# Patient Record
Sex: FEMALE | Race: WHITE | NOT HISPANIC OR LATINO | Employment: OTHER | URBAN - METROPOLITAN AREA
[De-identification: names, ages, dates, MRNs, and addresses within clinical notes are randomized per-mention and may not be internally consistent; named-entity substitution may affect disease eponyms.]

---

## 2017-07-06 ENCOUNTER — OFFICE VISIT (OUTPATIENT)
Dept: NEUROSURGERY | Facility: CLINIC | Age: 77
End: 2017-07-06

## 2017-07-06 VITALS — TEMPERATURE: 96.9 F | WEIGHT: 130 LBS | BODY MASS INDEX: 20.89 KG/M2 | HEIGHT: 66 IN

## 2017-07-06 DIAGNOSIS — M48.061 LUMBAR STENOSIS: Primary | ICD-10-CM

## 2017-07-06 PROCEDURE — 99203 OFFICE O/P NEW LOW 30 MIN: CPT | Performed by: NEUROLOGICAL SURGERY

## 2017-07-06 RX ORDER — UBIDECARENONE 75 MG
50 CAPSULE ORAL DAILY
COMMUNITY

## 2017-07-06 RX ORDER — AZITHROMYCIN 250 MG/1
250 TABLET, FILM COATED ORAL DAILY
COMMUNITY
End: 2017-12-01

## 2017-07-06 RX ORDER — BENZONATATE 200 MG/1
200 CAPSULE ORAL 3 TIMES DAILY PRN
COMMUNITY
End: 2017-12-01

## 2017-07-06 RX ORDER — MAGNESIUM CARB/ALUMINUM HYDROX 105-160MG
TABLET,CHEWABLE ORAL ONCE
COMMUNITY
End: 2017-12-01

## 2017-07-06 RX ORDER — ERGOCALCIFEROL (VITAMIN D2) 10 MCG
400 TABLET ORAL DAILY
COMMUNITY

## 2017-07-06 RX ORDER — NITROGLYCERIN 0.4 MG/1
0.4 TABLET SUBLINGUAL
COMMUNITY

## 2017-07-06 RX ORDER — ASPIRIN 81 MG/1
81 TABLET ORAL DAILY
COMMUNITY

## 2017-07-06 RX ORDER — ROSUVASTATIN CALCIUM 10 MG/1
10 TABLET, COATED ORAL DAILY
COMMUNITY

## 2017-07-06 RX ORDER — TEMAZEPAM 30 MG/1
30 CAPSULE ORAL NIGHTLY PRN
COMMUNITY

## 2017-07-06 RX ORDER — CHLORAL HYDRATE 500 MG
1000 CAPSULE ORAL
COMMUNITY

## 2017-07-06 NOTE — PROGRESS NOTES
Subjective   Patient ID: Ramya Contreras is a 76 y.o. female is being seen for consultation today at the request of Leigh Agustin MD  Chief Complaint: Right hip and leg pain    History of Present Illness: The patient is a 76-year-old woman with a 5 year history of pain radiating from her right hip to her right leg, particularly with standing and walking.  It began after she had a revision of a right hip joint replacement in 2012.  She has had a left hip replacement as well.  She complains of pain occurs particularly with standing and walking, although with sitting she has to tilt toward the left and tends to put her hand on her right hip.  She recently visited [facility] and after the trip decided pain had become sufficiently severe that she wanted to seek medical relief.  MRI of her lumbar spine was done.  She complains of her right foot turning in when she walks.  He exercises regularly.    Review of Radiographic Studies:   Lumbar MRI scan shows a moderately severe stenosis bilaterally at L4 5 with a mild scoliosis.    The following portions of the patient's history were reviewed, updated as appropriate and approved: allergies, current medications, past family history, past medical history, past social history, past surgical history, review of systems and problem list.    Review of Systems   Constitutional: Negative for activity change, appetite change, chills, diaphoresis, fatigue, fever and unexpected weight change.   HENT: Negative for congestion, dental problem, drooling, ear discharge, ear pain, facial swelling, hearing loss, mouth sores, nosebleeds, postnasal drip, rhinorrhea, sinus pressure, sneezing, sore throat, tinnitus, trouble swallowing and voice change.    Eyes: Negative for photophobia, pain, discharge, redness, itching and visual disturbance.   Respiratory: Negative for apnea, cough, choking, chest tightness, shortness of breath, wheezing and stridor.    Cardiovascular: Negative for chest pain,  palpitations and leg swelling.   Gastrointestinal: Negative for abdominal distention, abdominal pain, anal bleeding, blood in stool, constipation, diarrhea, nausea, rectal pain and vomiting.   Endocrine: Negative for cold intolerance, heat intolerance, polydipsia, polyphagia and polyuria.   Genitourinary: Negative for decreased urine volume, difficulty urinating, dysuria, enuresis, flank pain, frequency, genital sores, hematuria and urgency.   Musculoskeletal: Positive for back pain and myalgias. Negative for arthralgias, gait problem, joint swelling, neck pain and neck stiffness.   Skin: Negative for color change, pallor, rash and wound.   Allergic/Immunologic: Negative for environmental allergies, food allergies and immunocompromised state.   Neurological: Negative for dizziness, tremors, seizures, syncope, facial asymmetry, speech difficulty, weakness, light-headedness, numbness and headaches.   Hematological: Negative for adenopathy. Does not bruise/bleed easily.   Psychiatric/Behavioral: Negative for agitation, behavioral problems, confusion, decreased concentration, dysphoric mood, hallucinations, self-injury, sleep disturbance and suicidal ideas. The patient is not nervous/anxious and is not hyperactive.        Objective     NEUROLOGICAL EXAMINATION:      MENTAL STATUS:  Alert and oriented.  Speech intact.  Recent and remote memory intact.      CRANIAL NERVES:  Cranial nerve II:  Visual fields are full to confrontation.  Cranial nerves III, IV and VI:  PERRLADC.  Extraocular movements are intact.  Nystagmus is not present.  Cranial nerve V:  Facial sensation is intact to light touch.  Cranial nerve VII:  Muscles of facial expression reveal no asymmetry.  Cranial nerve VIII:  Hearing is intact to finger rub bilaterally.  Cranial nerves IX and X:  Palate elevates symmetrically.  Cranial nerve XI:  Shoulder shrug is intact.  Cranial nerve XII:  Tongue is midline without evidence of atrophy or  fasciculation.    MUSCULOSKELETAL:  SLR negative. Dennys's test negative.    MOTOR:  Deltoid, biceps, triceps, and  strength intact.  No hand atrophy.  Hip flexion, knee extension, ankle dorsiflexion and plantar flexion intact. No tremor, spasticity, ataxia, or dysmetria.    SENSATION:  Intact to touch upper and lower extremities.  Position sense intact.    REFLEXES:  DTR intact and symmetrical in upper and lower extremities.      Assessment   Lumbar stenosis L4 5 with right lumbar radiculopathy.       Plan   Physical therapy.  Follow-up in one month.  Minimally invasive laminectomy L4 5 if symptoms are not improved.       Cole Smith MD

## 2017-11-02 ENCOUNTER — OFFICE VISIT (OUTPATIENT)
Dept: NEUROSURGERY | Facility: CLINIC | Age: 77
End: 2017-11-02

## 2017-11-02 VITALS — HEIGHT: 66 IN | TEMPERATURE: 98 F | BODY MASS INDEX: 20.41 KG/M2 | WEIGHT: 127 LBS

## 2017-11-02 DIAGNOSIS — M48.062 SPINAL STENOSIS OF LUMBAR REGION WITH NEUROGENIC CLAUDICATION: Primary | ICD-10-CM

## 2017-11-02 DIAGNOSIS — M54.16 RIGHT LUMBAR RADICULOPATHY: ICD-10-CM

## 2017-11-02 PROCEDURE — 99213 OFFICE O/P EST LOW 20 MIN: CPT | Performed by: NEUROLOGICAL SURGERY

## 2017-11-02 NOTE — PATIENT INSTRUCTIONS
Laminectomy  During a laminectomy, small pieces of bone in the spine called lamina are removed. The ligaments underneath the lamina and parts of the joints that have grown too large are also removed. This takes pressure off the nerves.   LET YOUR HEALTH CARE PROVIDER KNOW ABOUT:  · Any allergies you have.  · All medicines you are taking, including vitamins, herbs, eye drops, creams, and over-the-counter medicines.  · Previous problems you or members of your family have had with the use of anesthetics.  · Any blood disorders you have.  · Previous surgeries you have had.  · Medical conditions you have.  RISKS AND COMPLICATIONS   Generally, laminectomy is a safe procedure. However, as with any procedure, complications can occur. Possible complications include:  · Infection near the incision.  · Nerve damage. Signs of this can be pain, weakness, or numbness.  · Leaking of spinal fluid.  · Blood clot in a leg. The clot can move to the lungs. This can be very serious.  · Bowel or bladder incontinence (rare).  BEFORE THE PROCEDURE   · You will need to stop taking certain medicines as directed by your health care provider.  · If you smoke, stop at least 2 weeks before the procedure. Smoking can slow down the healing process and increase the risk of complications.  · Do not eat or drink anything for at least 8 hours before the procedure. Take any medicines that your health care provider tells you to keep taking with a sip of water.  · Do not drink alcohol the day before your surgery.  · Tell your health care provider if you develop a cold or any infection before your surgery.  · Arrange for someone to drive you home after the procedure or after your hospital stay. Also arrange for someone to help you with activities during recovery.  PROCEDURE  · Small monitors will be placed on your body. They are used to check your heart, blood pressure, and oxygen level.  · An IV tube will be inserted into one of your veins. Medicine will  flow directly into your body through the IV tube.  · You might be given a sedative. This will help you relax.  · You will be given a medicine to make you sleep (general anesthetic), and a breathing tube will be placed into your lungs. During general anesthesia, you are unaware of the procedure and do not feel any pain.  · Your back will be cleaned with a special solution to kill germs on your skin.  · Once you are asleep, the surgeon will make a 2-inch to 5-inch cut (incision) in your back. The length of the incision will depend on how many spinal bones (vertebrae) are being operated on.  · Muscles in the back will be moved away from the vertebrae and pulled to the side.  · Pieces of lamina will be removed.  · The ligament that lies under the lamina and connects your vertebrae will be removed.  · Enough ligaments and thickened joints will be removed to take pressure off your nerves.  · Your nerves will be identified, and their passage will be tracked and assessed for excessive tightness.  · Your back muscles will be moved back into their normal position.  · The area under your skin will be closed with small, absorbable stitches. These stitches do not need to be removed.  · Your skin will be closed with small absorbable stitches or staples.  · A dressing will be put over your incision.  · The procedure may take 1-3 hours.  AFTER THE PROCEDURE   · You will stay in a recovery area until the anesthesia has worn off. Your blood pressure and pulse will be checked every so often. Then you will be taken to a hospital room.  · You may continue to get fluids through the IV tube for a while.  · Some pain is normal. You may be given pain medicine while still in the recovery area.  · It is important to be up and moving as soon as possible after a surgery. Physical therapists will help you start walking.  · To prevent blood clots in your legs:    You may be given special stockings to wear.    You may need to take medicine to  prevent clots.  · You may be asked to do special breathing exercises to re-expand your lungs. This is to prevent a lung infection.  · Most people stay in the hospital for 1-3 days after a laminectomy.     This information is not intended to replace advice given to you by your health care provider. Make sure you discuss any questions you have with your health care provider.     Document Released: 12/06/2010 Document Revised: 10/08/2014 Document Reviewed: 07/30/2014  [employer] Interactive Patient Education ©2017 [employer]  Laminectomy, Care After  Refer to this sheet in the next few weeks. These instructions provide you with information about caring for yourself after your procedure. Your health care provider may also give you more specific instructions. Your treatment has been planned according to current medical practices, but problems sometimes occur. Call your health care provider if you have any problems or questions after your procedure.  WHAT TO EXPECT AFTER THE PROCEDURE  After your procedure, it is common to have some pain around the incision.  HOME CARE INSTRUCTIONS  Bathing  · You may shower after the bandage (dressing) has been removed or as directed by your health care provider.  · Do not take baths, swim, or use a hot tub for 2 weeks or until your incision has healed completely. Check with your health care provider before you start any of these activities.  Incision Care  · There are many different ways to close and cover an incision, including stitches, skin glue, and adhesive strips. Follow instructions from your health care provider about:    Incision care.    Dressing changes and removal.    Incision closure removal.  · Check your incision area every day for signs of infection. Watch for:    Redness, swelling, or pain.    Fluid, blood, or pus.  Activity  · Return to your normal activities as directed by your health care provider. Ask your health care provider what activities are safe for  you.  · Perform breathing exercises if directed by your health care provider.  · Avoid bending or twisting at your waist. Always bend at your knees.  · Do not sit for more than 20-30 minutes at a time. Lie down or walk between periods of sitting.  · Do not lift anything that is heavier than 10 lb (4.5 kg).  · Do not drive for 2 weeks after your procedure or as directed by your health care provider. You may be a passenger for 20-30 minute trips.  · Do not drive or operate heavy machinery while taking pain medicine.  General Instructions  · Take medicines only as directed by your health care provider.  · Keep all follow-up visits as directed by your health care provider. This is important.  SEEK MEDICAL CARE IF:  · You have redness, swelling, or increasing pain in the area of your incision.  · You notice a bad smell coming from the incision or dressing.  · You are not able to return to activities or perform exercises as instructed by your health care provider.  SEEK IMMEDIATE MEDICAL CARE IF:  · You develop a rash.  · You have fluid, blood, or pus coming from your incision.  · You have chills or a fever.  · You have episodes of dizziness or fainting while standing.  · You develop shortness of breath or you have difficulty breathing.  · You lose the ability to control your bladder or bowel.  · You become weak.  · You cannot use your legs.     This information is not intended to replace advice given to you by your health care provider. Make sure you discuss any questions you have with your health care provider.     Document Released: 07/07/2006 Document Revised: 05/03/2016 Document Reviewed: 07/30/2014  [employer] Interactive Patient Education ©2017 [employer]    Lumbar Laminectomy, Care After  Refer to this sheet in the next few weeks. These instructions provide you with information on caring for yourself after your procedure. Your health care provider may also give you more specific instructions. Your treatment has  been planned according to current medical practices, but problems sometimes occur. Call your health care provider if you have any problems or questions after your procedure.  HOME CARE INSTRUCTIONS   · Check the incision twice a day for signs of infection. Some signs may include a foul-smelling, greenish or yellowish discharge from the wound, increased pain, or increased redness over the incision.  · Change your bandages about 24-36 hours after surgery, or as directed.  · You may shower once the bandage is removed, or as directed. Avoid tub baths, swimming, and hot tubs for 3 weeks or until your incision has healed completely. If you have stitches or staples, they may be removed 2-3 weeks after surgery, or as directed by your doctor.  · Daily exercise is helpful to prevent the return of problems. Walking is permitted. You may use a treadmill without an incline. Cut down on activities and exercise if you have discomfort. You may also go up and down stairs as much as you can tolerate.  · Do not lift anything heavier than 15 lb. Avoid bending or twisting at the waist. Always bend your knees.  · Maintain strength and range of motion as instructed.  · Do not drive for 2-3 weeks, or as directed by your doctor. You may be a passenger for 20-30 minutes at a time. Lying back in the passenger seat may be more comfortable for you.  · Limit your sitting to intervals of 20-30 minutes. You should lie down or walk in between sitting periods. There are no limitations for sitting in a recliner chair.  · Only take over-the-counter or prescription medicines for pain, discomfort, or fever as directed by your health care provider.  SEEK MEDICAL CARE IF:   · There is increased bleeding (more than a small spot) from the wound.  · You notice redness, swelling, or increasing pain in the wound.  · Pus is coming from the wound.  · You have a fever for more than 2-3 days.  · You notice a foul smell coming from the wound or dressing.  · You  have increasing pain in your wound.  SEEK IMMEDIATE MEDICAL CARE IF:   · You develop a rash.  · You have difficulty breathing.  · You have any allergic problems.  · You develop a headache or stiff neck that does not respond to pain relievers.  · You are unable to urinate.  · You develop new onset of pain, numbness, or weakness in the buttocks or lower extremities.  MAKE SURE YOU:   · Understand these instructions.  · Will watch your condition.  · Will get help right away if you are not doing well or get worse.     This information is not intended to replace advice given to you by your health care provider. Make sure you discuss any questions you have with your health care provider.     Document Released: 11/21/2005 Document Revised: 08/20/2014 Document Reviewed: 05/15/2014  [employer] Interactive Patient Education ©2017 [employer]      If you have any questions in regards to your visit with  today, please do not hesitate to contact our office. Ask to speak with a [facility] for any clinical questions you may have.    Karen Monge [facility]    [phone]

## 2017-11-02 NOTE — PROGRESS NOTES
Subjective   Ramya Contreras is a 77 y.o. female who presents for follow up of  right hip and leg pain.     The patient is a 77-year-old woman with a right hip and leg pain syndrome that has progressed over the past 5 years.  It now has become nearly constant and bothers her even when she is reclining and resting almost as much as when she stands or walks.  I last saw her nearly 4 months ago and reviewed an MRI scan that was done at the [facility] showing moderately severe stenosis at L4-5 and a mild degree of scoliosis.  This is been treated conservatively, including with physical therapy, but she is seeing no improvement.  She has had a bilateral hip replacement.    She is here today to discuss surgery.  She says her 4 children and her grandchildren are all opposed to surgery, but she is suffering enough from the pain constantly the she's ready to consider it.    The following portions of the patient's history were reviewed, updated as appropriate and approved: allergies, current medications, past family history, past medical history, past social history, past surgical history, review of systems and problem list.     Review of Systems   Constitutional: Negative for activity change, appetite change, chills, diaphoresis, fatigue, fever and unexpected weight change.   HENT: Negative for congestion, dental problem, drooling, ear discharge, ear pain, facial swelling, hearing loss, mouth sores, nosebleeds, postnasal drip, rhinorrhea, sinus pressure, sneezing, sore throat, tinnitus, trouble swallowing and voice change.    Eyes: Negative for photophobia, pain, discharge, redness, itching and visual disturbance.   Respiratory: Negative for apnea, cough, choking, chest tightness, shortness of breath, wheezing and stridor.    Cardiovascular: Negative for chest pain, palpitations and leg swelling.   Gastrointestinal: Negative for abdominal distention, abdominal pain, anal bleeding, blood in stool, constipation, diarrhea,  nausea, rectal pain and vomiting.   Endocrine: Negative for cold intolerance, heat intolerance, polydipsia, polyphagia and polyuria.   Genitourinary: Negative for decreased urine volume, difficulty urinating, dysuria, enuresis, flank pain, frequency, genital sores, hematuria and urgency.   Musculoskeletal: Positive for arthralgias and back pain (Right leg pain). Negative for gait problem, joint swelling, myalgias, neck pain and neck stiffness.   Skin: Negative for color change, pallor, rash and wound.   Allergic/Immunologic: Negative for environmental allergies, food allergies and immunocompromised state.   Neurological: Negative for dizziness, tremors, seizures, syncope, facial asymmetry, speech difficulty, weakness, light-headedness, numbness and headaches.   Hematological: Negative for adenopathy. Does not bruise/bleed easily.   Psychiatric/Behavioral: Negative for agitation, behavioral problems, confusion, decreased concentration, dysphoric mood, hallucinations, self-injury, sleep disturbance and suicidal ideas. The patient is not nervous/anxious and is not hyperactive.    All other systems reviewed and are negative.      Objective    NEUROLOGICAL EXAMINATION:    Straight leg raising is negative.  Dorsiflexion and plantar flexion are intact.  When she is walking her right leg tends to suddenly become weak and wobbly, but it is not a focal weakness.  There is no focal sensory loss.  Reflexes are difficult to find in either knee or ankle.    Assessment   Lumbar stenosis L4-5 bilaterally, symptomatic on the right.       Plan   Recommend minimal access lumbar laminectomy L4-5.  We discussed the surgery, potential complications, and recovery.  She lives alone and does not have anybody with her to help her in her recovery.       Cole Smith MD

## 2017-11-13 DIAGNOSIS — M48.062 SPINAL STENOSIS, LUMBAR REGION, WITH NEUROGENIC CLAUDICATION: Primary | ICD-10-CM

## 2017-11-13 DIAGNOSIS — R79.9 ABNORMAL FINDING OF BLOOD CHEMISTRY: ICD-10-CM

## 2017-11-13 PROBLEM — M54.16 RIGHT LUMBAR RADICULOPATHY: Status: ACTIVE | Noted: 2017-11-13

## 2017-11-13 RX ORDER — SODIUM CHLORIDE, SODIUM LACTATE, POTASSIUM CHLORIDE, CALCIUM CHLORIDE 600; 310; 30; 20 MG/100ML; MG/100ML; MG/100ML; MG/100ML
100 INJECTION, SOLUTION INTRAVENOUS CONTINUOUS
Status: CANCELLED | OUTPATIENT
Start: 2017-11-13

## 2017-11-13 RX ORDER — SODIUM CHLORIDE 0.9 % (FLUSH) 0.9 %
1-10 SYRINGE (ML) INJECTION AS NEEDED
Status: CANCELLED | OUTPATIENT
Start: 2017-11-13

## 2017-11-13 RX ORDER — CHLORHEXIDINE GLUCONATE 4 G/100ML
SOLUTION TOPICAL
Qty: 120 ML | Status: ON HOLD
Start: 2017-11-13 | End: 2017-12-08

## 2017-11-13 NOTE — H&P
HISTORY AND PHYSICAL for SURGERY    Referring Provider: Liegh Agustin MD    Chief complaint: Right hip and leg pain    Subjective .   History of present illness:    The patient is a 77-year-old woman with a right hip and leg pain syndrome that has progressed over the past 5 years.  It now has become nearly constant and bothers her even when she is reclining and resting almost as much as when she stands or walks.  I last saw her nearly 4 months ago and reviewed an MRI scan that was done at the [facility] showing moderately severe stenosis at L4-5 and a mild degree of scoliosis.  This is been treated conservatively, including with physical therapy, but she is seeing no improvement.  She has had a bilateral hip replacement.     She is here today to discuss surgery.  She says her 4 children and her grandchildren are all opposed to surgery, but she is suffering enough from the pain constantly the she's ready to consider it.    History  Past Medical History:   Diagnosis Date   • Bronchitis, acute    • Insomnia    • Lumbosacral radiculopathy    • Osteoarthritis    • Osteoporosis    • Sciatica    • Sinusitis, acute    • Spinal stenosis of lumbar region       Past Surgical History:   Procedure Laterality Date   • APPENDECTOMY     • HIP ARTHROPLASTY Left 07/01/2013   • HYSTEROSCOPY  01/01/1990   • TONSILLECTOMY     • TOTAL HIP ARTHROPLASTY Right 01/01/1997    family history includes Cancer in her father, mother, and sister.   Social History     Social History   • Marital status:      Spouse name: N/A   • Number of children: N/A   • Years of education: N/A     Social History Main Topics   • Smoking status: Never Smoker   • Smokeless tobacco: Not on file   • Alcohol use No   • Drug use: No   • Sexual activity: Not on file     Other Topics Concern   • Not on file     Social History Narrative       Allergies:  Percocet [oxycodone-acetaminophen] and Neurontin [gabapentin]    Objective   Review of  Systems  Constitutional: Negative for activity change, appetite change, chills, diaphoresis, fatigue, fever and unexpected weight change.   HENT: Negative for congestion, dental problem, drooling, ear discharge, ear pain, facial swelling, hearing loss, mouth sores, nosebleeds, postnasal drip, rhinorrhea, sinus pressure, sneezing, sore throat, tinnitus, trouble swallowing and voice change.    Eyes: Negative for photophobia, pain, discharge, redness, itching and visual disturbance.   Respiratory: Negative for apnea, cough, choking, chest tightness, shortness of breath, wheezing and stridor.    Cardiovascular: Negative for chest pain, palpitations and leg swelling.   Gastrointestinal: Negative for abdominal distention, abdominal pain, anal bleeding, blood in stool, constipation, diarrhea, nausea, rectal pain and vomiting.   Endocrine: Negative for cold intolerance, heat intolerance, polydipsia, polyphagia and polyuria.   Genitourinary: Negative for decreased urine volume, difficulty urinating, dysuria, enuresis, flank pain, frequency, genital sores, hematuria and urgency.   Musculoskeletal: Positive for arthralgias and back pain (Right leg pain). Negative for gait problem, joint swelling, myalgias, neck pain and neck stiffness.   Skin: Negative for color change, pallor, rash and wound.   Allergic/Immunologic: Negative for environmental allergies, food allergies and immunocompromised state.   Neurological: Negative for dizziness, tremors, seizures, syncope, facial asymmetry, speech difficulty, weakness, light-headedness, numbness and headaches.   Hematological: Negative for adenopathy. Does not bruise/bleed easily.   Psychiatric/Behavioral: Negative for agitation, behavioral problems, confusion, decreased concentration, dysphoric mood, hallucinations, self-injury, sleep disturbance and suicidal ideas. The patient is not nervous/anxious and is not hyperactive.      Physical Exam:  NEUROLOGICAL EXAMINATION:    Straight  leg raising is negative.  Dorsiflexion and plantar flexion are intact.  When she is walking her right leg tends to suddenly become weak and wobbly, but it is not a focal weakness.  There is no focal sensory loss.  Reflexes are difficult to find in either knee or ankle      Results Review:  MRI scan that was done at the [facility] showing moderately severe stenosis at L4-5 and a mild degree of scoliosis.    Assessment/Plan   Assessment       Lumbar stenosis L4-5 bilaterally, symptomatic on the right.           Plan       Recommend minimal access lumbar laminectomy L4-5.  We discussed the surgery, potential complications, and recovery.  She lives alone and does not have anybody with her to help her in her recovery.      Cassandra Lopez PA-C  On behalf of Cole Smith MD  11/13/17  3:28 PM

## 2017-12-01 ENCOUNTER — APPOINTMENT (OUTPATIENT)
Dept: PREADMISSION TESTING | Facility: HOSPITAL | Age: 77
End: 2017-12-01

## 2017-12-01 VITALS — HEIGHT: 65 IN | WEIGHT: 130.73 LBS | BODY MASS INDEX: 21.78 KG/M2

## 2017-12-01 DIAGNOSIS — M48.062 SPINAL STENOSIS, LUMBAR REGION, WITH NEUROGENIC CLAUDICATION: ICD-10-CM

## 2017-12-01 DIAGNOSIS — R79.9 ABNORMAL FINDING OF BLOOD CHEMISTRY: ICD-10-CM

## 2017-12-01 LAB
ALBUMIN SERPL-MCNC: 4.3 G/DL (ref 3.2–4.8)
ALBUMIN/GLOB SERPL: 1.5 G/DL (ref 1.5–2.5)
ALP SERPL-CCNC: 114 U/L (ref 25–100)
ALT SERPL W P-5'-P-CCNC: 11 U/L (ref 7–40)
ANION GAP SERPL CALCULATED.3IONS-SCNC: 5 MMOL/L (ref 3–11)
AST SERPL-CCNC: 20 U/L (ref 0–33)
BILIRUB SERPL-MCNC: 0.6 MG/DL (ref 0.3–1.2)
BUN BLD-MCNC: 21 MG/DL (ref 9–23)
BUN/CREAT SERPL: 21 (ref 7–25)
CALCIUM SPEC-SCNC: 9.4 MG/DL (ref 8.7–10.4)
CHLORIDE SERPL-SCNC: 108 MMOL/L (ref 99–109)
CO2 SERPL-SCNC: 27 MMOL/L (ref 20–31)
CREAT BLD-MCNC: 1 MG/DL (ref 0.6–1.3)
DEPRECATED RDW RBC AUTO: 42.3 FL (ref 37–54)
ERYTHROCYTE [DISTWIDTH] IN BLOOD BY AUTOMATED COUNT: 12.4 % (ref 11.3–14.5)
GFR SERPL CREATININE-BSD FRML MDRD: 54 ML/MIN/1.73
GLOBULIN UR ELPH-MCNC: 2.8 GM/DL
GLUCOSE BLD-MCNC: 86 MG/DL (ref 70–100)
HBA1C MFR BLD: 5.5 % (ref 4.8–5.6)
HCT VFR BLD AUTO: 35.7 % (ref 34.5–44)
HGB BLD-MCNC: 12 G/DL (ref 11.5–15.5)
MCH RBC QN AUTO: 30.9 PG (ref 27–31)
MCHC RBC AUTO-ENTMCNC: 33.6 G/DL (ref 32–36)
MCV RBC AUTO: 92 FL (ref 80–99)
PLATELET # BLD AUTO: 209 10*3/MM3 (ref 150–450)
PMV BLD AUTO: 10.5 FL (ref 6–12)
POTASSIUM BLD-SCNC: 4.3 MMOL/L (ref 3.5–5.5)
PROT SERPL-MCNC: 7.1 G/DL (ref 5.7–8.2)
RBC # BLD AUTO: 3.88 10*6/MM3 (ref 3.89–5.14)
SODIUM BLD-SCNC: 140 MMOL/L (ref 132–146)
WBC NRBC COR # BLD: 6.62 10*3/MM3 (ref 3.5–10.8)

## 2017-12-01 PROCEDURE — 93010 ELECTROCARDIOGRAM REPORT: CPT | Performed by: INTERNAL MEDICINE

## 2017-12-01 PROCEDURE — 85027 COMPLETE CBC AUTOMATED: CPT | Performed by: NEUROLOGICAL SURGERY

## 2017-12-01 PROCEDURE — 87081 CULTURE SCREEN ONLY: CPT | Performed by: NEUROLOGICAL SURGERY

## 2017-12-01 PROCEDURE — 93005 ELECTROCARDIOGRAM TRACING: CPT

## 2017-12-01 PROCEDURE — 83036 HEMOGLOBIN GLYCOSYLATED A1C: CPT | Performed by: NEUROLOGICAL SURGERY

## 2017-12-01 PROCEDURE — 80053 COMPREHEN METABOLIC PANEL: CPT | Performed by: NEUROLOGICAL SURGERY

## 2017-12-01 PROCEDURE — 36415 COLL VENOUS BLD VENIPUNCTURE: CPT

## 2017-12-01 NOTE — DISCHARGE INSTRUCTIONS
The following information and instructions were given:    NPO after MN except sips of water with routine prescribed medication (except blood thinner, diabetes, or weight reducing medication) unless otherwise instructed by your physician.  Do not eat, drink, smoke or chew gum after MN the night before surgery. This also includes no mints.    DO NOT shave for two days before your procedure.  Do not wear makeup.      DO NOT wear fingernail polish (gel/regular) and/or acrylic/artificial nails on the day of surgery.   If a patient had recent manicure and would rather not remove polish or artificial nails, then the minimum requirement is that the polish/artificial nails must be removed from the middle finger on each hand.      If patient was having surgery on an upper extremity, then the patient was instructed that fingernail polish/artificial fingernails must be removed for surgery.  NO EXCEPTIONS.      If patient was having surgery on a lower extremity, then the patient was instructed that toenail polish on both extremities must be removed for surgery.  NO EXCEPTIONS.    Remove all jewelry (advised to go to jeweler if unable to remove).  Jewelry especially rings can no longer be taped for surgery.    Leave anything you consider valuable at home.    Leave your suitcase in the car until after your surgery.    Bring the following with you (if applicable)   -picture ID and insurance cards   -Co-pay/deductible required by insurance   -Medications in the original bottles (not a list) including all over-the-counter  medications if not brought to PAT   -Copy of advance directive, living will or power of  documents if not  brought to PAT   -CPAP or BIPAP mask and tubing (do not bring machine)   -Skin prep instructions sheet   -PAT Pass    Education booklet, brochure, handout or binder given to patient.    Pain Control After Surgery handout given to patient.    Respirex use (handout given to patient) and pneumonia  prevention.    Signs and Symptoms of infection.    DVT Prevention stressing the importance of ambulation.    Patient to apply Chlorhexadine wipes to surgical area (as instructed) the night before procedure and the AM of procedure. WIPES GIVEN.

## 2017-12-03 LAB — MRSA SPEC QL CULT: NORMAL

## 2017-12-04 ENCOUNTER — TELEPHONE (OUTPATIENT)
Dept: NEUROSURGERY | Facility: CLINIC | Age: 77
End: 2017-12-04

## 2017-12-04 NOTE — TELEPHONE ENCOUNTER
Provider: Luis   Caller: pt  Time of call: 1050  Phone #:  [phone]  Surgery: MINIMALLY INVASIVE LUMBAR LAMINECTOMY L4-5 RIGHT   Surgery Date: 12/8/17   Last visit: 11/2/17    Next visit: surgery         Reason for call:  Pt called stating she would like us to set up home health for her after surgery. She lives alone and will need assistance.

## 2017-12-08 ENCOUNTER — HOSPITAL ENCOUNTER (OUTPATIENT)
Facility: HOSPITAL | Age: 77
Discharge: HOME | End: 2017-12-09
Attending: NEUROLOGICAL SURGERY | Admitting: NEUROLOGICAL SURGERY

## 2017-12-08 ENCOUNTER — ANESTHESIA EVENT (OUTPATIENT)
Dept: PERIOP | Facility: HOSPITAL | Age: 77
End: 2017-12-08

## 2017-12-08 ENCOUNTER — ANESTHESIA (OUTPATIENT)
Dept: PERIOP | Facility: HOSPITAL | Age: 77
End: 2017-12-08

## 2017-12-08 ENCOUNTER — APPOINTMENT (OUTPATIENT)
Dept: GENERAL RADIOLOGY | Facility: HOSPITAL | Age: 77
End: 2017-12-08

## 2017-12-08 DIAGNOSIS — M48.062 SPINAL STENOSIS, LUMBAR REGION, WITH NEUROGENIC CLAUDICATION: ICD-10-CM

## 2017-12-08 PROBLEM — M51.26 HERNIATION OF LUMBAR INTERVERTEBRAL DISC: Status: ACTIVE | Noted: 2017-12-08

## 2017-12-08 PROCEDURE — 25010000002 PROPOFOL 10 MG/ML EMULSION: Performed by: NURSE ANESTHETIST, CERTIFIED REGISTERED

## 2017-12-08 PROCEDURE — A9270 NON-COVERED ITEM OR SERVICE: HCPCS | Performed by: NEUROLOGICAL SURGERY

## 2017-12-08 PROCEDURE — A9270 NON-COVERED ITEM OR SERVICE: HCPCS | Performed by: ANESTHESIOLOGY

## 2017-12-08 PROCEDURE — 25010000002 HYDROMORPHONE PER 4 MG: Performed by: NEUROLOGICAL SURGERY

## 2017-12-08 PROCEDURE — 63710000001 CARISOPRODOL 350 MG TABLET: Performed by: NEUROLOGICAL SURGERY

## 2017-12-08 PROCEDURE — 25010000002 FENTANYL CITRATE (PF) 100 MCG/2ML SOLUTION: Performed by: NURSE ANESTHETIST, CERTIFIED REGISTERED

## 2017-12-08 PROCEDURE — 63710000001 FAMOTIDINE 20 MG TABLET: Performed by: ANESTHESIOLOGY

## 2017-12-08 PROCEDURE — 63710000001 ROSUVASTATIN 10 MG TABLET: Performed by: NEUROLOGICAL SURGERY

## 2017-12-08 PROCEDURE — 63710000001 BISACODYL 5 MG TABLET DELAYED-RELEASE: Performed by: NEUROLOGICAL SURGERY

## 2017-12-08 PROCEDURE — 63047 LAM FACETEC & FORAMOT LUMBAR: CPT | Performed by: NEUROLOGICAL SURGERY

## 2017-12-08 PROCEDURE — 25010000003 CEFAZOLIN IN DEXTROSE 2-4 GM/100ML-% SOLUTION: Performed by: NEUROLOGICAL SURGERY

## 2017-12-08 PROCEDURE — 63710000001 ASPIRIN 81 MG TABLET DELAYED-RELEASE: Performed by: NEUROLOGICAL SURGERY

## 2017-12-08 PROCEDURE — 63710000001 DOCUSATE SODIUM 100 MG CAPSULE: Performed by: NEUROLOGICAL SURGERY

## 2017-12-08 PROCEDURE — 25010000002 ONDANSETRON PER 1 MG: Performed by: NURSE ANESTHETIST, CERTIFIED REGISTERED

## 2017-12-08 PROCEDURE — 25010000002 DEXAMETHASONE PER 1 MG: Performed by: NEUROLOGICAL SURGERY

## 2017-12-08 PROCEDURE — 63710000001 HYDROCODONE-ACETAMINOPHEN 7.5-325 MG TABLET: Performed by: NEUROLOGICAL SURGERY

## 2017-12-08 PROCEDURE — 25010000002 NEOSTIGMINE 10 MG/10ML SOLUTION: Performed by: NURSE ANESTHETIST, CERTIFIED REGISTERED

## 2017-12-08 PROCEDURE — 76000 FLUOROSCOPY <1 HR PHYS/QHP: CPT

## 2017-12-08 PROCEDURE — 25010000002 DEXAMETHASONE PER 1 MG: Performed by: NURSE ANESTHETIST, CERTIFIED REGISTERED

## 2017-12-08 RX ORDER — DIPHENHYDRAMINE HCL 25 MG
25 CAPSULE ORAL EVERY 6 HOURS PRN
Status: DISCONTINUED | OUTPATIENT
Start: 2017-12-08 | End: 2017-12-09 | Stop reason: HOSPADM

## 2017-12-08 RX ORDER — BUPIVACAINE HYDROCHLORIDE AND EPINEPHRINE 2.5; 5 MG/ML; UG/ML
INJECTION, SOLUTION EPIDURAL; INFILTRATION; INTRACAUDAL; PERINEURAL AS NEEDED
Status: DISCONTINUED | OUTPATIENT
Start: 2017-12-08 | End: 2017-12-08 | Stop reason: HOSPADM

## 2017-12-08 RX ORDER — FAMOTIDINE 20 MG/1
20 TABLET, FILM COATED ORAL ONCE
Status: COMPLETED | OUTPATIENT
Start: 2017-12-08 | End: 2017-12-08

## 2017-12-08 RX ORDER — NITROGLYCERIN 0.4 MG/1
0.4 TABLET SUBLINGUAL
Status: DISCONTINUED | OUTPATIENT
Start: 2017-12-08 | End: 2017-12-09 | Stop reason: HOSPADM

## 2017-12-08 RX ORDER — ROCURONIUM BROMIDE 10 MG/ML
INJECTION, SOLUTION INTRAVENOUS AS NEEDED
Status: DISCONTINUED | OUTPATIENT
Start: 2017-12-08 | End: 2017-12-08 | Stop reason: SURG

## 2017-12-08 RX ORDER — DIPHENHYDRAMINE HCL 25 MG
25 CAPSULE ORAL NIGHTLY PRN
Status: DISCONTINUED | OUTPATIENT
Start: 2017-12-08 | End: 2017-12-09 | Stop reason: HOSPADM

## 2017-12-08 RX ORDER — SODIUM CHLORIDE 0.9 % (FLUSH) 0.9 %
1-10 SYRINGE (ML) INJECTION AS NEEDED
Status: DISCONTINUED | OUTPATIENT
Start: 2017-12-08 | End: 2017-12-08 | Stop reason: HOSPADM

## 2017-12-08 RX ORDER — DEXAMETHASONE SODIUM PHOSPHATE 4 MG/ML
INJECTION, SOLUTION INTRA-ARTICULAR; INTRALESIONAL; INTRAMUSCULAR; INTRAVENOUS; SOFT TISSUE AS NEEDED
Status: DISCONTINUED | OUTPATIENT
Start: 2017-12-08 | End: 2017-12-08 | Stop reason: SURG

## 2017-12-08 RX ORDER — DEXAMETHASONE 4 MG/1
4 TABLET ORAL EVERY 6 HOURS SCHEDULED
Status: DISCONTINUED | OUTPATIENT
Start: 2017-12-08 | End: 2017-12-09 | Stop reason: HOSPADM

## 2017-12-08 RX ORDER — PROMETHAZINE HYDROCHLORIDE 25 MG/ML
12.5 INJECTION, SOLUTION INTRAMUSCULAR; INTRAVENOUS EVERY 6 HOURS PRN
Status: DISCONTINUED | OUTPATIENT
Start: 2017-12-08 | End: 2017-12-09 | Stop reason: HOSPADM

## 2017-12-08 RX ORDER — SODIUM CHLORIDE, SODIUM LACTATE, POTASSIUM CHLORIDE, CALCIUM CHLORIDE 600; 310; 30; 20 MG/100ML; MG/100ML; MG/100ML; MG/100ML
100 INJECTION, SOLUTION INTRAVENOUS CONTINUOUS
Status: DISCONTINUED | OUTPATIENT
Start: 2017-12-08 | End: 2017-12-09 | Stop reason: HOSPADM

## 2017-12-08 RX ORDER — MAGNESIUM HYDROXIDE 1200 MG/15ML
LIQUID ORAL AS NEEDED
Status: DISCONTINUED | OUTPATIENT
Start: 2017-12-08 | End: 2017-12-08 | Stop reason: HOSPADM

## 2017-12-08 RX ORDER — UBIDECARENONE 75 MG
50 CAPSULE ORAL DAILY
Status: DISCONTINUED | OUTPATIENT
Start: 2017-12-08 | End: 2017-12-09 | Stop reason: HOSPADM

## 2017-12-08 RX ORDER — FENTANYL CITRATE 50 UG/ML
50 INJECTION, SOLUTION INTRAMUSCULAR; INTRAVENOUS
Status: DISCONTINUED | OUTPATIENT
Start: 2017-12-08 | End: 2017-12-08 | Stop reason: HOSPADM

## 2017-12-08 RX ORDER — SENNA AND DOCUSATE SODIUM 50; 8.6 MG/1; MG/1
1 TABLET, FILM COATED ORAL NIGHTLY PRN
Status: DISCONTINUED | OUTPATIENT
Start: 2017-12-08 | End: 2017-12-09 | Stop reason: HOSPADM

## 2017-12-08 RX ORDER — CEFAZOLIN SODIUM 2 G/100ML
2 INJECTION, SOLUTION INTRAVENOUS EVERY 8 HOURS
Status: COMPLETED | OUTPATIENT
Start: 2017-12-08 | End: 2017-12-09

## 2017-12-08 RX ORDER — NEOSTIGMINE METHYLSULFATE 1 MG/ML
INJECTION, SOLUTION INTRAVENOUS AS NEEDED
Status: DISCONTINUED | OUTPATIENT
Start: 2017-12-08 | End: 2017-12-08 | Stop reason: SURG

## 2017-12-08 RX ORDER — DEXAMETHASONE SODIUM PHOSPHATE 4 MG/ML
4 INJECTION, SOLUTION INTRA-ARTICULAR; INTRALESIONAL; INTRAMUSCULAR; INTRAVENOUS; SOFT TISSUE EVERY 6 HOURS SCHEDULED
Status: DISCONTINUED | OUTPATIENT
Start: 2017-12-08 | End: 2017-12-09 | Stop reason: HOSPADM

## 2017-12-08 RX ORDER — BISACODYL 10 MG
10 SUPPOSITORY, RECTAL RECTAL DAILY PRN
Status: DISCONTINUED | OUTPATIENT
Start: 2017-12-08 | End: 2017-12-09 | Stop reason: HOSPADM

## 2017-12-08 RX ORDER — NALOXONE HCL 0.4 MG/ML
0.4 VIAL (ML) INJECTION
Status: DISCONTINUED | OUTPATIENT
Start: 2017-12-08 | End: 2017-12-09 | Stop reason: HOSPADM

## 2017-12-08 RX ORDER — PROPOFOL 10 MG/ML
VIAL (ML) INTRAVENOUS AS NEEDED
Status: DISCONTINUED | OUTPATIENT
Start: 2017-12-08 | End: 2017-12-08 | Stop reason: SURG

## 2017-12-08 RX ORDER — SODIUM CHLORIDE, SODIUM LACTATE, POTASSIUM CHLORIDE, CALCIUM CHLORIDE 600; 310; 30; 20 MG/100ML; MG/100ML; MG/100ML; MG/100ML
9 INJECTION, SOLUTION INTRAVENOUS CONTINUOUS
Status: DISCONTINUED | OUTPATIENT
Start: 2017-12-08 | End: 2017-12-08

## 2017-12-08 RX ORDER — ROSUVASTATIN CALCIUM 10 MG/1
10 TABLET, COATED ORAL NIGHTLY
Status: DISCONTINUED | OUTPATIENT
Start: 2017-12-08 | End: 2017-12-09 | Stop reason: HOSPADM

## 2017-12-08 RX ORDER — DIPHENHYDRAMINE HYDROCHLORIDE 50 MG/ML
25 INJECTION INTRAMUSCULAR; INTRAVENOUS EVERY 6 HOURS PRN
Status: DISCONTINUED | OUTPATIENT
Start: 2017-12-08 | End: 2017-12-09 | Stop reason: HOSPADM

## 2017-12-08 RX ORDER — FAMOTIDINE 10 MG/ML
20 INJECTION, SOLUTION INTRAVENOUS ONCE
Status: DISCONTINUED | OUTPATIENT
Start: 2017-12-08 | End: 2017-12-08

## 2017-12-08 RX ORDER — ONDANSETRON 2 MG/ML
4 INJECTION INTRAMUSCULAR; INTRAVENOUS ONCE AS NEEDED
Status: DISCONTINUED | OUTPATIENT
Start: 2017-12-08 | End: 2017-12-08 | Stop reason: HOSPADM

## 2017-12-08 RX ORDER — DOCUSATE SODIUM 100 MG/1
100 CAPSULE, LIQUID FILLED ORAL 2 TIMES DAILY PRN
Status: DISCONTINUED | OUTPATIENT
Start: 2017-12-08 | End: 2017-12-09 | Stop reason: HOSPADM

## 2017-12-08 RX ORDER — ASPIRIN 81 MG/1
81 TABLET ORAL DAILY
Status: DISCONTINUED | OUTPATIENT
Start: 2017-12-08 | End: 2017-12-09 | Stop reason: HOSPADM

## 2017-12-08 RX ORDER — SODIUM CHLORIDE, SODIUM LACTATE, POTASSIUM CHLORIDE, CALCIUM CHLORIDE 600; 310; 30; 20 MG/100ML; MG/100ML; MG/100ML; MG/100ML
100 INJECTION, SOLUTION INTRAVENOUS CONTINUOUS
Status: DISCONTINUED | OUTPATIENT
Start: 2017-12-08 | End: 2017-12-08

## 2017-12-08 RX ORDER — CEFAZOLIN SODIUM 2 G/100ML
2 INJECTION, SOLUTION INTRAVENOUS ONCE
Status: COMPLETED | OUTPATIENT
Start: 2017-12-08 | End: 2017-12-08

## 2017-12-08 RX ORDER — OMEGA-3S/DHA/EPA/FISH OIL/D3 300MG-1000
400 CAPSULE ORAL DAILY
Status: DISCONTINUED | OUTPATIENT
Start: 2017-12-08 | End: 2017-12-09 | Stop reason: HOSPADM

## 2017-12-08 RX ORDER — GLYCOPYRROLATE 0.2 MG/ML
INJECTION INTRAMUSCULAR; INTRAVENOUS AS NEEDED
Status: DISCONTINUED | OUTPATIENT
Start: 2017-12-08 | End: 2017-12-08 | Stop reason: SURG

## 2017-12-08 RX ORDER — IBUPROFEN 400 MG/1
200 TABLET ORAL EVERY 4 HOURS PRN
Status: DISCONTINUED | OUTPATIENT
Start: 2017-12-08 | End: 2017-12-09 | Stop reason: HOSPADM

## 2017-12-08 RX ORDER — LIDOCAINE HYDROCHLORIDE 10 MG/ML
0.5 INJECTION, SOLUTION EPIDURAL; INFILTRATION; INTRACAUDAL; PERINEURAL ONCE AS NEEDED
Status: COMPLETED | OUTPATIENT
Start: 2017-12-08 | End: 2017-12-08

## 2017-12-08 RX ORDER — TEMAZEPAM 15 MG/1
30 CAPSULE ORAL NIGHTLY PRN
Status: DISCONTINUED | OUTPATIENT
Start: 2017-12-08 | End: 2017-12-09 | Stop reason: HOSPADM

## 2017-12-08 RX ORDER — PROMETHAZINE HYDROCHLORIDE 12.5 MG/1
12.5 TABLET ORAL EVERY 6 HOURS PRN
Status: DISCONTINUED | OUTPATIENT
Start: 2017-12-08 | End: 2017-12-09 | Stop reason: HOSPADM

## 2017-12-08 RX ORDER — HYDROMORPHONE HYDROCHLORIDE 1 MG/ML
0.5 INJECTION, SOLUTION INTRAMUSCULAR; INTRAVENOUS; SUBCUTANEOUS
Status: DISCONTINUED | OUTPATIENT
Start: 2017-12-08 | End: 2017-12-09 | Stop reason: HOSPADM

## 2017-12-08 RX ORDER — ONDANSETRON 4 MG/1
4 TABLET, FILM COATED ORAL EVERY 6 HOURS PRN
Status: DISCONTINUED | OUTPATIENT
Start: 2017-12-08 | End: 2017-12-09 | Stop reason: HOSPADM

## 2017-12-08 RX ORDER — LIDOCAINE HYDROCHLORIDE 10 MG/ML
INJECTION, SOLUTION EPIDURAL; INFILTRATION; INTRACAUDAL; PERINEURAL AS NEEDED
Status: DISCONTINUED | OUTPATIENT
Start: 2017-12-08 | End: 2017-12-08 | Stop reason: SURG

## 2017-12-08 RX ORDER — ONDANSETRON 2 MG/ML
4 INJECTION INTRAMUSCULAR; INTRAVENOUS EVERY 6 HOURS PRN
Status: DISCONTINUED | OUTPATIENT
Start: 2017-12-08 | End: 2017-12-09 | Stop reason: HOSPADM

## 2017-12-08 RX ORDER — BISACODYL 5 MG/1
5 TABLET, DELAYED RELEASE ORAL DAILY PRN
Status: DISCONTINUED | OUTPATIENT
Start: 2017-12-08 | End: 2017-12-09 | Stop reason: HOSPADM

## 2017-12-08 RX ORDER — PROMETHAZINE HYDROCHLORIDE 12.5 MG/1
12.5 SUPPOSITORY RECTAL EVERY 6 HOURS PRN
Status: DISCONTINUED | OUTPATIENT
Start: 2017-12-08 | End: 2017-12-09 | Stop reason: HOSPADM

## 2017-12-08 RX ORDER — CARISOPRODOL 350 MG/1
350 TABLET ORAL 4 TIMES DAILY PRN
Status: DISCONTINUED | OUTPATIENT
Start: 2017-12-08 | End: 2017-12-09 | Stop reason: HOSPADM

## 2017-12-08 RX ORDER — HYDROCODONE BITARTRATE AND ACETAMINOPHEN 7.5; 325 MG/1; MG/1
1 TABLET ORAL EVERY 4 HOURS PRN
Status: DISCONTINUED | OUTPATIENT
Start: 2017-12-08 | End: 2017-12-09 | Stop reason: HOSPADM

## 2017-12-08 RX ORDER — FENTANYL CITRATE 50 UG/ML
INJECTION, SOLUTION INTRAMUSCULAR; INTRAVENOUS AS NEEDED
Status: DISCONTINUED | OUTPATIENT
Start: 2017-12-08 | End: 2017-12-08 | Stop reason: SURG

## 2017-12-08 RX ORDER — ONDANSETRON 2 MG/ML
INJECTION INTRAMUSCULAR; INTRAVENOUS AS NEEDED
Status: DISCONTINUED | OUTPATIENT
Start: 2017-12-08 | End: 2017-12-08 | Stop reason: SURG

## 2017-12-08 RX ADMIN — DOCUSATE SODIUM 100 MG: 100 CAPSULE, LIQUID FILLED ORAL at 20:45

## 2017-12-08 RX ADMIN — FAMOTIDINE 20 MG: 20 TABLET, FILM COATED ORAL at 06:59

## 2017-12-08 RX ADMIN — LIDOCAINE HYDROCHLORIDE 0.2 ML: 10 INJECTION, SOLUTION EPIDURAL; INFILTRATION; INTRACAUDAL; PERINEURAL at 06:45

## 2017-12-08 RX ADMIN — ONDANSETRON 4 MG: 2 INJECTION INTRAMUSCULAR; INTRAVENOUS at 08:37

## 2017-12-08 RX ADMIN — CEFAZOLIN SODIUM 2 G: 2 INJECTION, SOLUTION INTRAVENOUS at 07:34

## 2017-12-08 RX ADMIN — FENTANYL CITRATE 50 MCG: 50 INJECTION INTRAMUSCULAR; INTRAVENOUS at 09:29

## 2017-12-08 RX ADMIN — HYDROCODONE BITARTRATE AND ACETAMINOPHEN 1 TABLET: 7.5; 325 TABLET ORAL at 17:27

## 2017-12-08 RX ADMIN — SODIUM CHLORIDE, POTASSIUM CHLORIDE, SODIUM LACTATE AND CALCIUM CHLORIDE 9 ML/HR: 600; 310; 30; 20 INJECTION, SOLUTION INTRAVENOUS at 06:45

## 2017-12-08 RX ADMIN — BISACODYL 5 MG: 5 TABLET, COATED ORAL at 20:45

## 2017-12-08 RX ADMIN — DEXAMETHASONE SODIUM PHOSPHATE 4 MG: 4 INJECTION, SOLUTION INTRAMUSCULAR; INTRAVENOUS at 12:02

## 2017-12-08 RX ADMIN — CARISOPRODOL 350 MG: 350 TABLET ORAL at 12:02

## 2017-12-08 RX ADMIN — ASPIRIN 81 MG: 81 TABLET, COATED ORAL at 12:02

## 2017-12-08 RX ADMIN — GLYCOPYRROLATE 0.6 MG: 0.2 INJECTION, SOLUTION INTRAMUSCULAR; INTRAVENOUS at 08:38

## 2017-12-08 RX ADMIN — DEXAMETHASONE SODIUM PHOSPHATE 4 MG: 4 INJECTION, SOLUTION INTRAMUSCULAR; INTRAVENOUS at 17:27

## 2017-12-08 RX ADMIN — EPHEDRINE SULFATE 10 MG: 50 INJECTION INTRAMUSCULAR; INTRAVENOUS; SUBCUTANEOUS at 08:02

## 2017-12-08 RX ADMIN — FENTANYL CITRATE 50 MCG: 50 INJECTION INTRAMUSCULAR; INTRAVENOUS at 09:20

## 2017-12-08 RX ADMIN — ROCURONIUM BROMIDE 30 MG: 10 INJECTION INTRAVENOUS at 07:30

## 2017-12-08 RX ADMIN — ROSUVASTATIN CALCIUM 10 MG: 10 TABLET, FILM COATED ORAL at 20:42

## 2017-12-08 RX ADMIN — LIDOCAINE HYDROCHLORIDE 50 MG: 10 INJECTION, SOLUTION EPIDURAL; INFILTRATION; INTRACAUDAL; PERINEURAL at 07:30

## 2017-12-08 RX ADMIN — EPHEDRINE SULFATE 5 MG: 50 INJECTION INTRAMUSCULAR; INTRAVENOUS; SUBCUTANEOUS at 08:23

## 2017-12-08 RX ADMIN — CEFAZOLIN SODIUM 2 G: 2 INJECTION, SOLUTION INTRAVENOUS at 15:40

## 2017-12-08 RX ADMIN — HYDROMORPHONE HYDROCHLORIDE 0.5 MG: 10 INJECTION INTRAMUSCULAR; INTRAVENOUS; SUBCUTANEOUS at 12:03

## 2017-12-08 RX ADMIN — PROPOFOL 120 MG: 10 INJECTION, EMULSION INTRAVENOUS at 07:30

## 2017-12-08 RX ADMIN — SODIUM CHLORIDE, POTASSIUM CHLORIDE, SODIUM LACTATE AND CALCIUM CHLORIDE: 600; 310; 30; 20 INJECTION, SOLUTION INTRAVENOUS at 08:35

## 2017-12-08 RX ADMIN — FENTANYL CITRATE 100 MCG: 50 INJECTION, SOLUTION INTRAMUSCULAR; INTRAVENOUS at 07:30

## 2017-12-08 RX ADMIN — DEXAMETHASONE SODIUM PHOSPHATE 4 MG: 4 INJECTION, SOLUTION INTRAMUSCULAR; INTRAVENOUS at 07:46

## 2017-12-08 RX ADMIN — EPHEDRINE SULFATE 10 MG: 50 INJECTION INTRAMUSCULAR; INTRAVENOUS; SUBCUTANEOUS at 07:36

## 2017-12-08 RX ADMIN — NEOSTIGMINE METHYLSULFATE 4 MG: 1 INJECTION, SOLUTION INTRAVENOUS at 08:38

## 2017-12-08 NOTE — PLAN OF CARE
Problem: Perioperative Period (Adult)  Goal: Signs and Symptoms of Listed Potential Problems Will be Absent or Manageable (Perioperative Period)  Outcome: Outcome(s) achieved Date Met:  12/08/17

## 2017-12-08 NOTE — PLAN OF CARE
Problem: Perioperative Period (Adult)  Goal: Signs and Symptoms of Listed Potential Problems Will be Absent or Manageable (Perioperative Period)  Outcome: Ongoing (interventions implemented as appropriate)    12/08/17 0704   Perioperative Period   Problems Assessed (Perioperative Period) all   Problems Present (Perioperative Period) none

## 2017-12-08 NOTE — PLAN OF CARE
Problem: Patient Care Overview (Adult)  Goal: Plan of Care Review  Outcome: Ongoing (interventions implemented as appropriate)    12/08/17 0555   Coping/Psychosocial Response Interventions   Plan Of Care Reviewed With patient   Patient Care Overview   Progress improving         12/08/17 0555   Coping/Psychosocial Response Interventions   Plan Of Care Reviewed With patient   Patient Care Overview   Progress improving

## 2017-12-08 NOTE — INTERVAL H&P NOTE
Pre-Op H&P (See Recent Office Note Attached)    Chief complaint: Low back pain into bilateral hips and legs    Review of Systems:  General ROS:  no fever, chills, rashes, No change since last office visit  Cardiovascular ROS: no chest pain or dyspnea on exertion  Respiratory ROS: no cough, shortness of breath, or wheezing    Immunization History:   Influenza:  Yes 2017  Pneumococcal:  Yes < 5 years  Tetanus:  unknown    Vital Signs:  Afebrile HR 59 O2 98% /68  Physical Exam:    CV:  S1S2 regular rate and rhythm, no murmur               Resp:  Clear to auscultation; respirations regular, even and unlabored    Results Review:    I reviewed the patient's new clinical results.    Cancer Staging (if applicable)  Cancer Patient: __ yes _x_no __unknown; If yes, clinical stage T:__ N:__M:__, stage group or __N/A    Isabel Byrd, APRN  12/8/2017   6:35 AM

## 2017-12-08 NOTE — BRIEF OP NOTE
LUMBAR LAMINECTOMY DISCECTOMY MICRO ENDOSCOPIC  Progress Note    Ramya Contreras  12/8/2017    Pre-op Diagnosis:   Right lumbar radiculopathy [M54.16]  Spinal stenosis of lumbar region with neurogenic claudication [M48.062]       Post-Op Diagnosis Codes:     * Right lumbar radiculopathy [M54.16]     * Spinal stenosis of lumbar region with neurogenic claudication [M48.062]    Procedure/CPT® Codes:      Procedure(s):  MINIMALLY INVASIVE LUMBAR LAMINECTOMY L4-5     Surgeon(s):  Cole Smith MD    Anesthesia: General    Staff:   Circulator: Tracy Dahl RN  Scrub Person: Nikky Magaña  Nursing Assistant: Kashmir More  Assistant: Cassandra Lopez PA-C  Orientee: Ja Rivera    Estimated Blood Loss: minimal    Urine Voided: * No values recorded between 12/8/2017  7:24 AM and 12/8/2017  8:41 AM *    Specimens:                None      Drains:           Findings: Stenosis L4-5    Complications: None      Cole Smith MD     Date: 12/8/2017  Time: 8:41 AM

## 2017-12-08 NOTE — ANESTHESIA PREPROCEDURE EVALUATION
Anesthesia Evaluation     Patient summary reviewed and Nursing notes reviewed   no history of anesthetic complications:  NPO Solid Status: > 8 hours  NPO Liquid Status: > 8 hours     Airway   Mallampati: I  TM distance: >3 FB  Neck ROM: full  no difficulty expected  Dental - normal exam     Pulmonary - negative pulmonary ROS and normal exam    breath sounds clear to auscultation  Cardiovascular - normal exam  Exercise tolerance: good (4-7 METS)    ECG reviewed  Rhythm: regular  Rate: normal    (+) hyperlipidemia      Neuro/Psych  (+) numbness (radiulopathy lower extremites), psychiatric history Anxiety,    GI/Hepatic/Renal/Endo    (-) GERD, hepatitis, liver disease, diabetes, hypothyroidism    Musculoskeletal     (+) arthralgias, back pain,   Abdominal    Substance History - negative use     OB/GYN          Other   (+) arthritis                                     Anesthesia Plan    ASA 2     general   (Labs/studies)  intravenous induction   Anesthetic plan and risks discussed with patient.    Plan discussed with CRNA.

## 2017-12-08 NOTE — PLAN OF CARE
Problem: Patient Care Overview (Adult)  Goal: Adult Individualization and Mutuality  Outcome: Ongoing (interventions implemented as appropriate)    12/08/17 0555   Individualization   Patient Specific Preferences none   Patient Specific Goals none   Patient Specific Interventions none   Mutuality/Individual Preferences   What Anxieties, Fears or Concerns Do You Have About Your Health or Care? none   What Questions Do You Have About Your Health or Care? none   What Information Would Help Us Give You More Personalized Care? none

## 2017-12-08 NOTE — H&P (VIEW-ONLY)
HISTORY AND PHYSICAL for SURGERY    Referring Provider: Leigh Agustin MD    Chief complaint: Right hip and leg pain    Subjective .   History of present illness:    The patient is a 77-year-old woman with a right hip and leg pain syndrome that has progressed over the past 5 years.  It now has become nearly constant and bothers her even when she is reclining and resting almost as much as when she stands or walks.  I last saw her nearly 4 months ago and reviewed an MRI scan that was done at the [facility] showing moderately severe stenosis at L4-5 and a mild degree of scoliosis.  This is been treated conservatively, including with physical therapy, but she is seeing no improvement.  She has had a bilateral hip replacement.     She is here today to discuss surgery.  She says her 4 children and her grandchildren are all opposed to surgery, but she is suffering enough from the pain constantly the she's ready to consider it.    History  Past Medical History:   Diagnosis Date   • Bronchitis, acute    • Insomnia    • Lumbosacral radiculopathy    • Osteoarthritis    • Osteoporosis    • Sciatica    • Sinusitis, acute    • Spinal stenosis of lumbar region       Past Surgical History:   Procedure Laterality Date   • APPENDECTOMY     • HIP ARTHROPLASTY Left 07/01/2013   • HYSTEROSCOPY  01/01/1990   • TONSILLECTOMY     • TOTAL HIP ARTHROPLASTY Right 01/01/1997    family history includes Cancer in her father, mother, and sister.   Social History     Social History   • Marital status:      Spouse name: N/A   • Number of children: N/A   • Years of education: N/A     Social History Main Topics   • Smoking status: Never Smoker   • Smokeless tobacco: Not on file   • Alcohol use No   • Drug use: No   • Sexual activity: Not on file     Other Topics Concern   • Not on file     Social History Narrative       Allergies:  Percocet [oxycodone-acetaminophen] and Neurontin [gabapentin]    Objective   Review of  Systems  Constitutional: Negative for activity change, appetite change, chills, diaphoresis, fatigue, fever and unexpected weight change.   HENT: Negative for congestion, dental problem, drooling, ear discharge, ear pain, facial swelling, hearing loss, mouth sores, nosebleeds, postnasal drip, rhinorrhea, sinus pressure, sneezing, sore throat, tinnitus, trouble swallowing and voice change.    Eyes: Negative for photophobia, pain, discharge, redness, itching and visual disturbance.   Respiratory: Negative for apnea, cough, choking, chest tightness, shortness of breath, wheezing and stridor.    Cardiovascular: Negative for chest pain, palpitations and leg swelling.   Gastrointestinal: Negative for abdominal distention, abdominal pain, anal bleeding, blood in stool, constipation, diarrhea, nausea, rectal pain and vomiting.   Endocrine: Negative for cold intolerance, heat intolerance, polydipsia, polyphagia and polyuria.   Genitourinary: Negative for decreased urine volume, difficulty urinating, dysuria, enuresis, flank pain, frequency, genital sores, hematuria and urgency.   Musculoskeletal: Positive for arthralgias and back pain (Right leg pain). Negative for gait problem, joint swelling, myalgias, neck pain and neck stiffness.   Skin: Negative for color change, pallor, rash and wound.   Allergic/Immunologic: Negative for environmental allergies, food allergies and immunocompromised state.   Neurological: Negative for dizziness, tremors, seizures, syncope, facial asymmetry, speech difficulty, weakness, light-headedness, numbness and headaches.   Hematological: Negative for adenopathy. Does not bruise/bleed easily.   Psychiatric/Behavioral: Negative for agitation, behavioral problems, confusion, decreased concentration, dysphoric mood, hallucinations, self-injury, sleep disturbance and suicidal ideas. The patient is not nervous/anxious and is not hyperactive.      Physical Exam:  NEUROLOGICAL EXAMINATION:    Straight  leg raising is negative.  Dorsiflexion and plantar flexion are intact.  When she is walking her right leg tends to suddenly become weak and wobbly, but it is not a focal weakness.  There is no focal sensory loss.  Reflexes are difficult to find in either knee or ankle      Results Review:  MRI scan that was done at the [facility] showing moderately severe stenosis at L4-5 and a mild degree of scoliosis.    Assessment/Plan   Assessment       Lumbar stenosis L4-5 bilaterally, symptomatic on the right.           Plan       Recommend minimal access lumbar laminectomy L4-5.  We discussed the surgery, potential complications, and recovery.  She lives alone and does not have anybody with her to help her in her recovery.      Cassandra Lopez PA-C  On behalf of Cole Smith MD  11/13/17  3:28 PM

## 2017-12-08 NOTE — ANESTHESIA POSTPROCEDURE EVALUATION
Patient: Ramya Contreras    Procedure Summary     Date Anesthesia Start Anesthesia Stop Room / Location    12/08/17 0724   [facility] / [facility]       Procedure Diagnosis Surgeon Provider    MINIMALLY INVASIVE LUMBAR LAMINECTOMY L4-5 RIGHT (Right Spine Lumbar) Right lumbar radiculopathy; Spinal stenosis of lumbar region with neurogenic claudication  (Right lumbar radiculopathy [M54.16]; Spinal stenosis of lumbar region with neurogenic claudication [M48.062]) MD Phylicia Lam MD          Anesthesia Type: general  Last vitals  BP   133/74   Temp   98.2   Pulse   90   Resp   16   SpO2   100     Post Anesthesia Care and Evaluation    Patient location during evaluation: PACU  Patient participation: complete - patient participated  Level of consciousness: awake and alert  Pain score: 0  Pain management: adequate  Airway patency: patent  Anesthetic complications: No anesthetic complications  PONV Status: none  Cardiovascular status: hemodynamically stable and acceptable  Respiratory status: nonlabored ventilation, acceptable and nasal cannula  Hydration status: acceptable

## 2017-12-08 NOTE — ANESTHESIA PROCEDURE NOTES
Airway  Urgency: elective    Airway not difficult    General Information and Staff    Patient location during procedure: OR  Anesthesiologist: ZARINA LEE  CRNA: KELLE GARCIA    Indications and Patient Condition  Indications for airway management: airway protection    Preoxygenated: yes  MILS not maintained throughout  Mask difficulty assessment: 1 - vent by mask    Final Airway Details  Final airway type: endotracheal airway      Successful airway: ETT  Cuffed: yes   Successful intubation technique: direct laryngoscopy and video laryngoscopy  Endotracheal tube insertion site: oral  Blade: Lior  Blade size: #3  ETT size: 6.5 mm  Placement verified by: chest auscultation and capnometry   Inital cuff pressure (cm H2O): 5  Measured from: lips  ETT to lips (cm): 22  Number of attempts at approach: 1    Additional Comments  Baseline sats 94%.  Pre-oxygenated to ETO2 of 75%.  Initial attempt via DL with no view-very anterior.  Grade I view with glidescope.  Post-intubation: Negative epigastric sounds, Breath sound equal bilaterally with symmetric chest rise and fall

## 2017-12-09 VITALS
HEART RATE: 68 BPM | OXYGEN SATURATION: 96 % | RESPIRATION RATE: 16 BRPM | SYSTOLIC BLOOD PRESSURE: 111 MMHG | HEIGHT: 65 IN | BODY MASS INDEX: 21.78 KG/M2 | DIASTOLIC BLOOD PRESSURE: 53 MMHG | TEMPERATURE: 97.4 F | WEIGHT: 130.73 LBS

## 2017-12-09 LAB
HCT VFR BLD AUTO: 30.4 % (ref 34.5–44)
HGB BLD-MCNC: 10 G/DL (ref 11.5–15.5)

## 2017-12-09 PROCEDURE — 85018 HEMOGLOBIN: CPT | Performed by: NEUROLOGICAL SURGERY

## 2017-12-09 PROCEDURE — 99024 POSTOP FOLLOW-UP VISIT: CPT | Performed by: NEUROLOGICAL SURGERY

## 2017-12-09 PROCEDURE — A9270 NON-COVERED ITEM OR SERVICE: HCPCS | Performed by: NEUROLOGICAL SURGERY

## 2017-12-09 PROCEDURE — 85014 HEMATOCRIT: CPT | Performed by: NEUROLOGICAL SURGERY

## 2017-12-09 PROCEDURE — 25010000003 CEFAZOLIN IN DEXTROSE 2-4 GM/100ML-% SOLUTION: Performed by: NEUROLOGICAL SURGERY

## 2017-12-09 PROCEDURE — 63710000001 HYDROCODONE-ACETAMINOPHEN 7.5-325 MG TABLET: Performed by: NEUROLOGICAL SURGERY

## 2017-12-09 PROCEDURE — 63710000001 DEXAMETHASONE PER 0.25 MG: Performed by: NEUROLOGICAL SURGERY

## 2017-12-09 RX ADMIN — DEXAMETHASONE 4 MG: 4 TABLET ORAL at 00:05

## 2017-12-09 RX ADMIN — HYDROCODONE BITARTRATE AND ACETAMINOPHEN 1 TABLET: 7.5; 325 TABLET ORAL at 08:36

## 2017-12-09 RX ADMIN — DEXAMETHASONE 4 MG: 4 TABLET ORAL at 06:01

## 2017-12-09 RX ADMIN — CEFAZOLIN SODIUM 2 G: 2 INJECTION, SOLUTION INTRAVENOUS at 00:05

## 2017-12-09 NOTE — DISCHARGE PLACEMENT REQUEST
"Ramya Del Toro (77 y.o. Female)     Date of Birth Social Security Number Address Home Phone MRN    1940  [address] [id number] [phone] [id number]    Congregation Marital Status          Unknown        Admission Date Admission Type Admitting Provider Attending Provider Department, Room/Bed    17 Elective Cole Smith MD Bean, James R, MD [facility] 3G, S364/1    Discharge Date Discharge Disposition Discharge Destination         Home or Self Care             Attending Provider: Cole Smith MD     Allergies:  Percocet [Oxycodone-acetaminophen], Neurontin [Gabapentin]    Isolation:  None   Infection:  None   Code Status:  FULL    Ht:  165.1 cm (65\")   Wt:  59.3 kg (130 lb 11.7 oz)    Admission Cmt:  None   Principal Problem:  None                Active Insurance as of 2017     Primary Coverage     Payor Plan Insurance Group Employer/Plan Group    MEDICARE MEDICARE A & B      Payor Plan Address Payor Plan Phone Number Effective From Effective To    [address] [phone] 2005     [place] [address]       Subscriber Name Subscriber Birth Date Member ID       RAMYA DEL TORO 1940 [id number]           Secondary Coverage     Payor Plan Insurance Group Employer/Plan Group     FOR LIFE  FOR LIFE MC [id number]      Payor Plan Address Payor Plan Phone Number Effective From Effective To    [address] [phone] 2017     [place] [address]       Subscriber Name Subscriber Birth Date Member ID       RAMYA DEL TORO 1940 [id number]                 Emergency Contacts      (Rel.) Home Phone Work Phone Mobile Phone    Lynn Del Toro (Daughter) [phone] -- --            [facility] 3G  [address] [facility] [address]  Phone:  [phone]  Fax:   Date Ordered: Dec 9, 2017         Patient:  Ramya Del Toro MRN:  [id number]   [address] [facility] [address] :  " "1940  SSN:    Phone: [phone] Sex:  F     Weight: 59.3 kg (130 lb 11.7 oz)         Ht Readings from Last 1 Encounters:   12/08/17 165.1 cm (65\")         Walker                         (Order ID: [id number])    Diagnosis:  Spinal stenosis, lumbar region, with neurogenic claudication (M48.062 [ICD-10-CM] 724.03 [ICD-9-CM])   Quantity:  1     Equipment:  Walker Folding with Wheels  Length of Need (99 Months = Lifetime): 99 Months = Lifetime            Verbal Order Mode: Verbal with readback   Authorizing Provider: Cole Smith MD  Authorizing Provider's NPI: [id number]     Order Entered By: Leela Rinaldi RN 12/9/2017  9:37 AM     Electronically signed by:             Insurance Information                MEDICARE/MEDICARE A & B Phone: [phone]    Subscriber: Ramya Contreras Subscriber#: [id number]    Group#:  Precert#:          FOR LIFE/ FOR LIFE [facility] Phone: [phone]    Subscriber: Ramya Contreras Subscriber#: [id number]    Group#:  Precert#:              History & Physical      Cassandra Lopez PA-C at 11/13/2017  3:28 PM          HISTORY AND PHYSICAL for SURGERY    Referring Provider: Leigh Agustin MD    Chief complaint: Right hip and leg pain    Subjective .   History of present illness:    The patient is a 77-year-old woman with a right hip and leg pain syndrome that has progressed over the past 5 years.  It now has become nearly constant and bothers her even when she is reclining and resting almost as much as when she stands or walks.  I last saw her nearly 4 months ago and reviewed an MRI scan that was done at the [facility] showing moderately severe stenosis at L4-5 and a mild degree of scoliosis.  This is been treated conservatively, including with physical therapy, but she is seeing no improvement.  She has had a bilateral hip replacement.     She is here today to discuss surgery.  She says her 4 children and her grandchildren are all opposed " to surgery, but she is suffering enough from the pain constantly the she's ready to consider it.    History  Past Medical History:   Diagnosis Date   • Bronchitis, acute    • Insomnia    • Lumbosacral radiculopathy    • Osteoarthritis    • Osteoporosis    • Sciatica    • Sinusitis, acute    • Spinal stenosis of lumbar region       Past Surgical History:   Procedure Laterality Date   • APPENDECTOMY     • HIP ARTHROPLASTY Left 07/01/2013   • HYSTEROSCOPY  01/01/1990   • TONSILLECTOMY     • TOTAL HIP ARTHROPLASTY Right 01/01/1997    family history includes Cancer in her father, mother, and sister.   Social History     Social History   • Marital status:      Spouse name: N/A   • Number of children: N/A   • Years of education: N/A     Social History Main Topics   • Smoking status: Never Smoker   • Smokeless tobacco: Not on file   • Alcohol use No   • Drug use: No   • Sexual activity: Not on file     Other Topics Concern   • Not on file     Social History Narrative       Allergies:  Percocet [oxycodone-acetaminophen] and Neurontin [gabapentin]    Objective   Review of Systems  Constitutional: Negative for activity change, appetite change, chills, diaphoresis, fatigue, fever and unexpected weight change.   HENT: Negative for congestion, dental problem, drooling, ear discharge, ear pain, facial swelling, hearing loss, mouth sores, nosebleeds, postnasal drip, rhinorrhea, sinus pressure, sneezing, sore throat, tinnitus, trouble swallowing and voice change.    Eyes: Negative for photophobia, pain, discharge, redness, itching and visual disturbance.   Respiratory: Negative for apnea, cough, choking, chest tightness, shortness of breath, wheezing and stridor.    Cardiovascular: Negative for chest pain, palpitations and leg swelling.   Gastrointestinal: Negative for abdominal distention, abdominal pain, anal bleeding, blood in stool, constipation, diarrhea, nausea, rectal pain and vomiting.   Endocrine: Negative for cold  intolerance, heat intolerance, polydipsia, polyphagia and polyuria.   Genitourinary: Negative for decreased urine volume, difficulty urinating, dysuria, enuresis, flank pain, frequency, genital sores, hematuria and urgency.   Musculoskeletal: Positive for arthralgias and back pain (Right leg pain). Negative for gait problem, joint swelling, myalgias, neck pain and neck stiffness.   Skin: Negative for color change, pallor, rash and wound.   Allergic/Immunologic: Negative for environmental allergies, food allergies and immunocompromised state.   Neurological: Negative for dizziness, tremors, seizures, syncope, facial asymmetry, speech difficulty, weakness, light-headedness, numbness and headaches.   Hematological: Negative for adenopathy. Does not bruise/bleed easily.   Psychiatric/Behavioral: Negative for agitation, behavioral problems, confusion, decreased concentration, dysphoric mood, hallucinations, self-injury, sleep disturbance and suicidal ideas. The patient is not nervous/anxious and is not hyperactive.      Physical Exam:  NEUROLOGICAL EXAMINATION:    Straight leg raising is negative.  Dorsiflexion and plantar flexion are intact.  When she is walking her right leg tends to suddenly become weak and wobbly, but it is not a focal weakness.  There is no focal sensory loss.  Reflexes are difficult to find in either knee or ankle      Results Review:  MRI scan that was done at the [facility] showing moderately severe stenosis at L4-5 and a mild degree of scoliosis.    Assessment/Plan   Assessment       Lumbar stenosis L4-5 bilaterally, symptomatic on the right.           Plan       Recommend minimal access lumbar laminectomy L4-5.  We discussed the surgery, potential complications, and recovery.  She lives alone and does not have anybody with her to help her in her recovery.      Cassandra Lopez PA-C  On behalf of Cole Smith MD  11/13/17  3:28 PM       Electronically signed by Cassandra Lopez PA-C  at 11/13/2017  3:30 PM      Cole Smith MD at 12/8/2017  6:34 AM          Pre-Op H&P (See Recent Office Note Attached)    Chief complaint: Low back pain into bilateral hips and legs    Review of Systems:  General ROS:  no fever, chills, rashes, No change since last office visit  Cardiovascular ROS: no chest pain or dyspnea on exertion  Respiratory ROS: no cough, shortness of breath, or wheezing    Immunization History:   Influenza:  Yes 2017  Pneumococcal:  Yes < 5 years  Tetanus:  unknown    Vital Signs:  Afebrile HR 59 O2 98% /68  Physical Exam:    CV:  S1S2 regular rate and rhythm, no murmur               Resp:  Clear to auscultation; respirations regular, even and unlabored    Results Review:    I reviewed the patient's new clinical results.    Cancer Staging (if applicable)  Cancer Patient: __ yes _x_no __unknown; If yes, clinical stage T:__ N:__M:__, stage group or __N/A    Isabel Byrd, APRN  12/8/2017   6:35 AM         Electronically signed by Cole Smith MD at 12/8/2017  6:36 AM    Source Note             HISTORY AND PHYSICAL for SURGERY    Referring Provider: Leigh Agustin MD    Chief complaint: Right hip and leg pain    Subjective .   History of present illness:    The patient is a 77-year-old woman with a right hip and leg pain syndrome that has progressed over the past 5 years.  It now has become nearly constant and bothers her even when she is reclining and resting almost as much as when she stands or walks.  I last saw her nearly 4 months ago and reviewed an MRI scan that was done at the [facility] showing moderately severe stenosis at L4-5 and a mild degree of scoliosis.  This is been treated conservatively, including with physical therapy, but she is seeing no improvement.  She has had a bilateral hip replacement.     She is here today to discuss surgery.  She says her 4 children and her grandchildren are all opposed to surgery, but she is suffering enough from the pain  constantly the she's ready to consider it.    History  Past Medical History:   Diagnosis Date   • Bronchitis, acute    • Insomnia    • Lumbosacral radiculopathy    • Osteoarthritis    • Osteoporosis    • Sciatica    • Sinusitis, acute    • Spinal stenosis of lumbar region       Past Surgical History:   Procedure Laterality Date   • APPENDECTOMY     • HIP ARTHROPLASTY Left 07/01/2013   • HYSTEROSCOPY  01/01/1990   • TONSILLECTOMY     • TOTAL HIP ARTHROPLASTY Right 01/01/1997    family history includes Cancer in her father, mother, and sister.   Social History     Social History   • Marital status:      Spouse name: N/A   • Number of children: N/A   • Years of education: N/A     Social History Main Topics   • Smoking status: Never Smoker   • Smokeless tobacco: Not on file   • Alcohol use No   • Drug use: No   • Sexual activity: Not on file     Other Topics Concern   • Not on file     Social History Narrative       Allergies:  Percocet [oxycodone-acetaminophen] and Neurontin [gabapentin]    Objective   Review of Systems  Constitutional: Negative for activity change, appetite change, chills, diaphoresis, fatigue, fever and unexpected weight change.   HENT: Negative for congestion, dental problem, drooling, ear discharge, ear pain, facial swelling, hearing loss, mouth sores, nosebleeds, postnasal drip, rhinorrhea, sinus pressure, sneezing, sore throat, tinnitus, trouble swallowing and voice change.    Eyes: Negative for photophobia, pain, discharge, redness, itching and visual disturbance.   Respiratory: Negative for apnea, cough, choking, chest tightness, shortness of breath, wheezing and stridor.    Cardiovascular: Negative for chest pain, palpitations and leg swelling.   Gastrointestinal: Negative for abdominal distention, abdominal pain, anal bleeding, blood in stool, constipation, diarrhea, nausea, rectal pain and vomiting.   Endocrine: Negative for cold intolerance, heat intolerance, polydipsia, polyphagia  and polyuria.   Genitourinary: Negative for decreased urine volume, difficulty urinating, dysuria, enuresis, flank pain, frequency, genital sores, hematuria and urgency.   Musculoskeletal: Positive for arthralgias and back pain (Right leg pain). Negative for gait problem, joint swelling, myalgias, neck pain and neck stiffness.   Skin: Negative for color change, pallor, rash and wound.   Allergic/Immunologic: Negative for environmental allergies, food allergies and immunocompromised state.   Neurological: Negative for dizziness, tremors, seizures, syncope, facial asymmetry, speech difficulty, weakness, light-headedness, numbness and headaches.   Hematological: Negative for adenopathy. Does not bruise/bleed easily.   Psychiatric/Behavioral: Negative for agitation, behavioral problems, confusion, decreased concentration, dysphoric mood, hallucinations, self-injury, sleep disturbance and suicidal ideas. The patient is not nervous/anxious and is not hyperactive.      Physical Exam:  NEUROLOGICAL EXAMINATION:    Straight leg raising is negative.  Dorsiflexion and plantar flexion are intact.  When she is walking her right leg tends to suddenly become weak and wobbly, but it is not a focal weakness.  There is no focal sensory loss.  Reflexes are difficult to find in either knee or ankle      Results Review:  MRI scan that was done at the [facility] showing moderately severe stenosis at L4-5 and a mild degree of scoliosis.    Assessment/Plan   Assessment       Lumbar stenosis L4-5 bilaterally, symptomatic on the right.           Plan       Recommend minimal access lumbar laminectomy L4-5.  We discussed the surgery, potential complications, and recovery.  She lives alone and does not have anybody with her to help her in her recovery.      Cassandra Lopez PA-C  On behalf of Cole Smith MD  11/13/17  3:28 PM        Electronically signed by Cassandra Lopez PA-C at 11/13/2017  3:30 PM              Ramya Contreras  " #[id number] (CSN:[id number])  (77 y.o. F)  (Adm: 12/08/17)     [facility] 3G-S364-1           Attending Provider: Cole Smith MD  Comment              Last edited by  on  at        Allergies: Percocet [Oxycodone-acetaminophen], Neurontin [Gabapentin]    Isolation: (none)     Code Status: FULL      Ht: 165.1 cm (65\")     Wt: 59.3 kg (130 lb 11.7 oz)     Admission Wt: 59.3 kg (130 lb 11.7 oz)      Admission Cmt: None               Treatment Team        Provider Role Specialty From To       Cole Smith MD Attending Provider Neurosurgery 12/08/17 0606 --       Cole Smith MD Surgeon  Neurosurgery 11/13/17 1037 --       Cassandra Lopez PA-C Physician Assistant  Physician Assistant  12/08/17 0855 --       Laisha Curran Patient Care Technician  -- 12/09/17 0719 --       Leela Rinaldi, YRN Care Coordinator  -- 12/09/17 0735 --       Raysa Monge RN Registered Nurse  -- 12/09/17 0749 --           Orders to Be Acknowledged  Comment  Acknowledge All  Hide              Last edited by  on  at          New Orders Ordered at: Fri Dec 8, 2017 11:22 AM to be Acknowledged  Acknowledge Section       Start       Ordering Provider          12/08/17 1123  Inpatient Consult to Case Management  Start: 12/08/17 1123, End: 12/08/17 1123, Once, Standing Count: 1 Occurrences, R   Provider: (Not yet assigned)    Cole Smith MD Acknowledge New                  New Orders Ordered at: Fri Dec 8, 2017  7:05 AM to be Acknowledged  Acknowledge Section       Start       Ordering Provider          12/08/17 0706  Inpatient Consult to Case Management  Start: 12/08/17 0706, End: 12/08/17 0706, STAT, Standing Count: 1 Occurrences, STAT,  Discontinue Reason: Patient Transfer, Status: Canceled   Provider: (Not yet assigned)    Cole Smith MD Acknowledge New                  New Discontinued Orders to be Acknowledged  Acknowledge Section       Discontinued       Discontinuing Provider          12/08/17 " 0903  Inpatient Consult to Case Management  Start: 12/08/17 0706, End: 12/08/17 0706, STAT, Standing Count: 1 Occurrences, STAT,  Discontinue Reason: Patient Transfer, Status: Canceled   Provider: (Not yet assigned)    Cole Smith MD Acknowledge Discontinue                    Length of Stay        Actual Admission Date          0 days 12/08/2017            BestPractice Advisories        Click to view active BestPractice Advisories         Treatment Team Sticky Notes  Comment              Last edited by  on  at             Sticky Notes to Physicians  Comment              Last edited by  on  at               Medical Problems  Comment              Last edited by  on  at          Hospital Problem List  Date Reviewed: 12/8/2017                Codes Priority Class Noted POA       Right lumbar radiculopathy ICD-10-CM: M54.16  ICD-9-CM: 724.4   11/13/2017 Unknown       Overview Signed 11/13/2017 10:37 AM by Cole Smith MD       Added automatically from request for surgery [id number]       Spinal stenosis of lumbar region with neurogenic claudication ICD-10-CM: M48.062  ICD-9-CM: 724.03   11/13/2017 Unknown       Overview Signed 11/13/2017 10:37 AM by Cole Smith MD       Added automatically from request for surgery [id number]       Herniation of lumbar intervertebral disc ICD-10-CM: M51.26  ICD-9-CM: 722.10   12/8/2017 Yes       Spinal stenosis, lumbar region, with neurogenic claudication ICD-10-CM: M48.062  ICD-9-CM: 724.03   12/8/2017 Yes                Non-Hospital Problem List  Date Reviewed: 12/8/2017                Codes Priority Class Noted       Lumbar stenosis ICD-10-CM: M48.061  ICD-9-CM: 724.02   7/6/2017       Overview Signed 7/6/2017 10:02 AM by Cole Smith MD       L4-5                Ancillary Consults    Expand  Hide           Start       Ordered       12/08/17 1123  Inpatient Consult to Case Management  Once    Complete Provider: (Not yet assigned)    12/08/17 1122            Vital Signs (last 2 days)        Date/Time    Temp    Pulse    Resp    BP    O2 Device    SpO2       12/09/17 0800  97.4 (36.3)  68  16  111/53  --  96       12/09/17 0438  98.2 (36.8)  81  16  111/55  --  94       12/08/17 2333  98 (36.7)  74  16  92/50  --  92       12/08/17 2000  --  --  --  --  room air  --       12/08/17 1926  98.2 (36.8)  80  16  104/59  --  93       12/08/17 1727  --  --  --  --  room air  --       12/08/17 1634  97.8 (36.6)  86  16  110/55  --  --       12/08/17 1557  --  --  --  --  room air  --       12/08/17 1510  98.5 (36.9)  85  16  114/64  room air  94       12/08/17 1410  --  --  --  --  room air  --       12/08/17 1202  --  --  --  --  room air  --       12/08/17 1024  97.6 (36.4)  70  16  135/63  room air  97       12/08/17 1018  --  --  --  --  room air  --       12/08/17 1000  97.8 (36.6)  75  16  129/74  --  99       12/08/17 0945  97.8 (36.6)  78  16  128/75  --  100       12/08/17 0930  97.5 (36.4)  75  16  130/64  --  100       12/08/17 0915  97.8 (36.6)  81  16  139/73  --  98       12/08/17 0910  98 (36.7)  82  16  135/71  --  100       12/08/17 0905  --  92  16  130/69  --  100       12/08/17 0853  98.1 (36.7)  94  16  128/72  nasal cannula  99       12/08/17 0702  97.7 (36.5)  61  18  134/68  room air  100                  Intake/Output Detail Report      Date 12/07/17 0701 - 12/08/17 0700 12/08/17 0701 - 12/09/17 0700 12/09/17 0701 - 12/10/17 0700   Shift 8210-3021 8175-1675 Total 0701-1900 1901-0700 Total 1140-0773 3662-3438 Total   I  N  T  A  K  E   P.O.    120  120         P.O.    120  120       I.V.    1100  1100         Volume (mL) (lactated ringers infusion)    1100  1100       Shift Total    1220  1220      O  U  T  P  U  T   Urine             Urine           Shift Total          NET    670 -950 -280               Patient Lines/Drains/Airways Status        Active Lines, Drains, and Airways        Peripheral IV Line - Single  Lumen 12/08/17 0645 cephalic vein (lateral side of arm), left 18 gauge       Placement date:  12/08/17           Placement time:  0645  Days:  1         Assessments               12/09/17 0600 12/09/17 0400                       Indication/Daily Review of Necessity  fluid thera-  py intermit-  tent   fluid thera-  py intermit-  tent            Site Preparation/Maintenance  dressing:  dry and int-  act   dressing:  dry and int-  act            Securement  catheter  stabilizati-  on device,  secured with   catheter  stabilizati-  on device,  secured with            Patency/Maintenance  flushed wit-  hout diffic-  ulty   flushed wit-  hout diffic-  ulty            Pump Type and Serial Number  infusion  pump   infusion  pump            Phlebitis  0-->no symp-  toms   0-->no symp-  toms            Infiltration  0-->no symp-  toms   0-->no symp-  toms                                   Orders and Labs        Orders Overview Labs Since Admission         All Flowsheet Templates (all recorded)        Adult Patient Profile       Adult PCS Body System       Agents       Andres Score       All CPM LDAs       All CPM LDAs       Anesthesia Checklist       Assess       Billing Modifiers       CARE PLAN MINI-FLOWSHEET DATA       Checklist       Chronic Pain Assessment       CM Responsibilities       [place] Suicide Severity Rating Scale Past Month (C-SSRS Screen Version)       Critical Care Adult PCS Body System       Custom Formula Data       Daily care/ Safety       Data       Devices Testing Template       Document Patient Belongings       Encounter Vitals       ICU Vital Signs       Incision / Wounds       Intake/Output              Intra-op Care Plan       Lactation       Lines / Drains       Living Environment       narcotic waste       NPO Status       OR Readiness       Pain       Positioning       Pre-Op OR Checklist       Pressure Ulcer Screening       Profile       Readmission Risk Score       Respiratory        Screenings       Transfer of Care       Travel and Exposure Screening       Vaccination Screening       Vital Signs       Vitals Reassessment       Vitals, Intake and Output           ADT Events           Unit Room Bed Service Event       12/08/17 0606  ALONDRA OR ALONDRA OR NONE Surgery Admission       12/08/17 0637  ALONDRA OR ALONDRA OR NONE Surgery Patient Update       12/08/17 1013  ALONDRA OR ALONDRA OR NONE Surgery Transfer Out       12/08/17 1013  ALONDRA 3G S364 1 Surgery Transfer In           Appointments for Next 30 Days        None       Utilization Review           Reviewed on 12/8/2017 by Ahn Trinidad RN        Review Entered Review Status Criteria Set Name - Subset Criteria Status       12/8/2017 In Primary Outpatient        Details         12-8-17  Presented for Minimally Invasive Lumbar Laminectomy L4-5, Right.   OP

## 2017-12-09 NOTE — DISCHARGE SUMMARY
DISCHARGE SUMMARY    Date of Admission: 12/8/2017    Date of Discharge:  12/9/2017    Discharge Diagnosis: Lumbar stenosis L4-5    Procedures Performed:  Procedure(s):  MINIMALLY INVASIVE LUMBAR LAMINECTOMY L4-5    Referring Physician: Leigh Agustin M.D.    Presenting Problem/History of Present Illness  Right lumbar radiculopathy [M54.16]  Spinal stenosis of lumbar region with neurogenic claudication [M48.062]  Herniation of lumbar intervertebral disc [M51.26]  Spinal stenosis, lumbar region, with neurogenic claudication [M48.062]     Hospital Course  Patient is a 77 y.o. female who presented with a history of back pain radiating to the right hip and leg for 5 years and more recently to the left hip and leg.  MRI scan showed a moderately severe lumbar stenosis at L4-5 with minimal scoliosis.  Symptoms were not improved with physical therapy.    On the day of admission a minimal access lumbar laminectomy at L4-5 was performed.  There were no surgical complications.  The patient promptly resumed ambulation, oral intake, regular diet, and bladder function.  She was discharged home on the morning following surgery.  She lives alone, and home health was requested for wound checks.  Wound closure is subcuticular with Dermabond skin closure, such that she can shower.  Follow-up will be scheduled in 4 weeks.    Discharge Medications   Ramya Contreras   Home Medication Instructions ZUNILDA:[id number]    Printed on:12/09/17 0744   Medication Information                      aspirin 81 MG EC tablet  Take 81 mg by mouth Daily.             Garlic 10 MG capsule  Take 1 tablet/day by mouth Daily.             Glucosamine-Chondroitin (GLUCOSAMINE CHONDR COMPLEX PO)  Take 1 tablet/day by mouth Daily.             nitroglycerin (NITROSTAT) 0.4 MG SL tablet  Place 0.4 mg under the tongue Every 5 (Five) Minutes As Needed for Chest Pain. Take no more than 3 doses in 15 minutes.             Omega-3 Fatty Acids (FISH OIL) 1000 MG capsule  capsule  Take 1,000 mg by mouth Daily With Breakfast.             rosuvastatin (CRESTOR) 10 MG tablet  Take 10 mg by mouth Daily.             sertraline (ZOLOFT) 50 MG tablet  Take 50 mg by mouth Daily.             temazepam (RESTORIL) 30 MG capsule  Take 30 mg by mouth At Night As Needed for Sleep.             vitamin B-12 (CYANOCOBALAMIN) 100 MCG tablet  Take 50 mcg by mouth Daily.             Vitamin D, Cholecalciferol, (CHOLECALCIFEROL) 400 UNITS tablet  Take 400 Units by mouth Daily.                 Cole Smith MD  12/09/17  7:44 AM

## 2017-12-09 NOTE — DISCHARGE PLACEMENT REQUEST
"Ramya Del Toro (77 y.o. Female)     Date of Birth Social Security Number Address Home Phone MRN    1940  [address] [facility] [facility] [address] [phone] [id number]    [employer] Marital Status          Unknown        Admission Date Admission Type Admitting Provider Attending Provider Department, Room/Bed    12/8/17 Elective Cole Smith MD Bean, James R, MD [facility] 3G, S364/1    Discharge Date Discharge Disposition Discharge Destination         Home or Self Care             Attending Provider: Cole Smith MD     Allergies:  Percocet [Oxycodone-acetaminophen], Neurontin [Gabapentin]    Isolation:  None   Infection:  None   Code Status:  FULL    Ht:  165.1 cm (65\")   Wt:  59.3 kg (130 lb 11.7 oz)    Admission Cmt:  None   Principal Problem:  None                Active Insurance as of 12/8/2017     Primary Coverage     Payor Plan Insurance Group Employer/Plan Group    MEDICARE MEDICARE A & B      Payor Plan Address Payor Plan Phone Number Effective From Effective To    [address] [phone] 7/1/2005     [place] [address]       Subscriber Name Subscriber Birth Date Member ID       RAMYA DEL TORO 1940 [id number]           Secondary Coverage     Payor Plan Insurance Group Employer/Plan Group     FOR LIFE  FOR LIFE MC SUPP      Payor Plan Address Payor Plan Phone Number Effective From Effective To    [address] [phone] 6/23/2017     [place] [address]       Subscriber Name Subscriber Birth Date Member ID       RAMYA DEL TORO 1940 [id number]                 Emergency Contacts      (Rel.) Home Phone Work Phone Mobile Phone    Lynn Del Toro (Daughter) [phone] -- --            [address]  [address] [facility] [address]  Phone:  [phone]  Fax:   Date: Dec 9, 2017      Referral to Home Health     Patient:  Ramya Del Toro MRN:  [id number]   [address] [facility] " [address] :  1940  SSN:    Phone: [phone] Sex:  F      INSURANCE PAYOR PLAN GROUP # SUBSCRIBER ID   Primary:  Secondary: MEDICARE   FOR LIFE [id number]  [id number]   [id number]  [id number]      Referring Provider Information:  TISH SMITH Phone: [phone] Fax:       Referral Information:   # Visits:  1 Referral Type: Home Health [42]   Urgency:  Routine Referral Reason: Specialty Services Required   Start Date: Dec 9, 2017 End Date:  To be determined by Insurer   Diagnosis: Spinal stenosis, lumbar region, with neurogenic claudication (M48.062 [ICD-10-CM] 724.03 [ICD-9-CM])      Refer to Dept:   Refer to Provider:   Refer to Facility:       Face to Face Visit Date: 2017  Follow-up Provider for Plan of Care? I will be treating the patient on an ongoing basis.  Please send me the Plan of Care for signature.  Follow-up Provider: TISH SMITH [[id number]]  Reason/Clinical Findings: Lumbar incision  Describe mobility limitations that make leaving home difficult: Postoperative lumbar pain  Nursing/Therapeutic Services Requested: Skilled Nursing  Skilled nursing orders: Wound care dressing/changes  Frequency: 1 Week 1     This document serves as a request of services and does not constitute Insurance authorization or approval of services.  To determine eligibility, please contact the members Insurance carrier to verify and review coverage.     If you have medical questions regarding this request for services. Please contact [address] at [phone] between the hours of 8:00am - 5:00pm (Mon-Fri).             Authorizing Provider:Tish Smith MD  Authorizing Provider's NPI: [id number]  Order Entered By: Tish Smith MD 2017  7:43 AM     Electronically signed by: Tish Smith MD 2017  7:43 AM           Insurance Information                MEDICARE/MEDICARE A & B Phone: [phone]    Subscriber: Ramya Contreras Subscriber#: [id number]    Group#:  Precert#:           FOR LIFE/ FOR LIFE  SUPP Phone: [phone]    Subscriber: Ramya Contreras Subscriber#: [id number]    Group#:  Precert#:              Discharge Summary      Cole Smith MD at 12/9/2017  7:44 AM          DISCHARGE SUMMARY    Date of Admission: 12/8/2017    Date of Discharge:  12/9/2017    Discharge Diagnosis: Lumbar stenosis L4-5    Procedures Performed:  Procedure(s):  MINIMALLY INVASIVE LUMBAR LAMINECTOMY L4-5    Referring Physician: Leigh Agustin M.D.    Presenting Problem/History of Present Illness  Right lumbar radiculopathy [M54.16]  Spinal stenosis of lumbar region with neurogenic claudication [M48.062]  Herniation of lumbar intervertebral disc [M51.26]  Spinal stenosis, lumbar region, with neurogenic claudication [M48.062]     Hospital Course  Patient is a 77 y.o. female who presented with a history of back pain radiating to the right hip and leg for 5 years and more recently to the left hip and leg.  MRI scan showed a moderately severe lumbar stenosis at L4-5 with minimal scoliosis.  Symptoms were not improved with physical therapy.    On the day of admission a minimal access lumbar laminectomy at L4-5 was performed.  There were no surgical complications.  The patient promptly resumed ambulation, oral intake, regular diet, and bladder function.  She was discharged home on the morning following surgery.  She lives alone, and home health was requested for wound checks.  Wound closure is subcuticular with Dermabond skin closure, such that she can shower.  Follow-up will be scheduled in 4 weeks.    Discharge Medications   Ramya Contreras   Home Medication Instructions ZUNILDA:[id number]    Printed on:12/09/17 0744   Medication Information                      aspirin 81 MG EC tablet  Take 81 mg by mouth Daily.             Garlic 10 MG capsule  Take 1 tablet/day by mouth Daily.             Glucosamine-Chondroitin (GLUCOSAMINE CHONDR COMPLEX PO)  Take 1 tablet/day by mouth Daily.              nitroglycerin (NITROSTAT) 0.4 MG SL tablet  Place 0.4 mg under the tongue Every 5 (Five) Minutes As Needed for Chest Pain. Take no more than 3 doses in 15 minutes.             Omega-3 Fatty Acids (FISH OIL) 1000 MG capsule capsule  Take 1,000 mg by mouth Daily With Breakfast.             rosuvastatin (CRESTOR) 10 MG tablet  Take 10 mg by mouth Daily.             sertraline (ZOLOFT) 50 MG tablet  Take 50 mg by mouth Daily.             temazepam (RESTORIL) 30 MG capsule  Take 30 mg by mouth At Night As Needed for Sleep.             vitamin B-12 (CYANOCOBALAMIN) 100 MCG tablet  Take 50 mcg by mouth Daily.             Vitamin D, Cholecalciferol, (CHOLECALCIFEROL) 400 UNITS tablet  Take 400 Units by mouth Daily.                 Cole Smith MD  12/09/17  7:44 AM     Electronically signed by Cole Smith MD at 12/9/2017  7:47 AM

## 2017-12-09 NOTE — PROGRESS NOTES
Continued Stay Note  [facility]     Patient Name: Ramya Contreras  MRN: [id number]  Today's Date: 12/9/2017    Admit Date: 12/8/2017          Discharge Plan       12/09/17 1005    Case Management/Social Work Plan    Plan Home with [facility]    Patient/Family In Agreement With Plan yes    Additional Comments Spoke with patient at bedside regarding CM consult for home needs.  Patient requests [facility] [employer] and prefers to have her sister pick one up in [place] at the [facility] [employer].  Spoke to Yue with [facility] [phone] and faxed orders to [phone].  Adilson has chosen [facility].  Spoke to Yue with [facility] [phone] who accepted the referral.  Faxed  orders to [phone].  No other discharge needs noted.  Patient plan is to discharge Home with [facility] today via car with family to transport.                Discharge Codes     None        Expected Discharge Date and Time     Expected Discharge Date Expected Discharge Time    Dec 9, 2017             Leela Rinaldi RN

## 2017-12-09 NOTE — DISCHARGE PLACEMENT REQUEST
"  Ramya Del Toro (77 y.o. Female)     Date of Birth Social Security Number Address Home Phone MRN    1940  [address] [id number] [phone] [id number]    Taoist Marital Status          Unknown        Admission Date Admission Type Admitting Provider Attending Provider Department, Room/Bed    12/8/17 Elective Cole Smith MD Bean, James R, MD [facility] 3G, S364/1    Discharge Date Discharge Disposition Discharge Destination         Home or Self Care             Attending Provider: Cole Smith MD     Allergies:  Percocet [Oxycodone-acetaminophen], Neurontin [Gabapentin]    Isolation:  None   Infection:  None   Code Status:  FULL    Ht:  165.1 cm (65\")   Wt:  59.3 kg (130 lb 11.7 oz)    Admission Cmt:  None   Principal Problem:  None                Active Insurance as of 12/8/2017     Primary Coverage     Payor Plan Insurance Group Employer/Plan Group    MEDICARE MEDICARE A & B      Payor Plan Address Payor Plan Phone Number Effective From Effective To    [address] [phone] 7/1/2005     [place] [address]       Subscriber Name Subscriber Birth Date Member ID       RAMYA DEL TORO 1940 [id number]           Secondary Coverage     Payor Plan Insurance Group Employer/Plan Group     FOR LIFE  FOR LIFE MC SUPP      Payor Plan Address Payor Plan Phone Number Effective From Effective To    [address] [phone] 6/23/2017     [place] [address]       Subscriber Name Subscriber Birth Date Member ID       RAMYA DEL TORO 1940 [id number]                 Emergency Contacts      (Rel.) Home Phone Work Phone Mobile Phone    Lynn Del Toro (Daughter) [phone] -- --            [address]  [address] [facility] [address]  Phone:  [phone]  Fax:   Date: Dec 9, 2017      Referral to Home Health     Patient:  Ramya Del Toro MRN:  [id number]   [address] [facility] " [place] [address] :  1940  SSN:    Phone: [phone] Sex:  F      INSURANCE PAYOR PLAN GROUP # SUBSCRIBER ID   Primary:  Secondary: MEDICARE   FOR LIFE [id number]  [id number]   [id number]  [id number]      Referring Provider Information:  TISH SMITH Phone: [phone] Fax:       Referral Information:   # Visits:  1 Referral Type: Home Health [42]   Urgency:  Routine Referral Reason: Specialty Services Required   Start Date: Dec 9, 2017 End Date:  To be determined by Insurer   Diagnosis: Spinal stenosis, lumbar region, with neurogenic claudication (M48.062 [ICD-10-CM] 724.03 [ICD-9-CM])      Refer to Dept:   Refer to Provider:   Refer to Facility:       Face to Face Visit Date: 2017  Follow-up Provider for Plan of Care? I will be treating the patient on an ongoing basis.  Please send me the Plan of Care for signature.  Follow-up Provider: TISH SMITH [[id number]]  Reason/Clinical Findings: Lumbar incision  Describe mobility limitations that make leaving home difficult: Postoperative lumbar pain  Nursing/Therapeutic Services Requested: Skilled Nursing  Skilled nursing orders: Wound care dressing/changes  Frequency: 1 Week 1     This document serves as a request of services and does not constitute Insurance authorization or approval of services.  To determine eligibility, please contact the members Insurance carrier to verify and review coverage.     If you have medical questions regarding this request for services. Please contact [address] at [phone] between the hours of 8:00am - 5:00pm (Mon-Fri).             Authorizing Provider:Tish Smith MD  Authorizing Provider's NPI: [id number]  Order Entered By: Tish Smith MD 2017  7:43 AM     Electronically signed by: Tish Smith MD 2017  7:43 AM           Insurance Information                MEDICARE/MEDICARE A & B Phone: [phone]    Subscriber: Ramya Contreras Subscriber#: [id number]    Group#:  Precert#:           FOR LIFE/ FOR LIFE  SUPP Phone: [phone]    Subscriber: Ramya Contreras Subscriber#: [id number]    Group#:  Precert#:              History & Physical      Cassandra Lopez PA-C at 11/13/2017  3:28 PM          HISTORY AND PHYSICAL for SURGERY    Referring Provider: Leigh Agustin MD    Chief complaint: Right hip and leg pain    Subjective .   History of present illness:    The patient is a 77-year-old woman with a right hip and leg pain syndrome that has progressed over the past 5 years.  It now has become nearly constant and bothers her even when she is reclining and resting almost as much as when she stands or walks.  I last saw her nearly 4 months ago and reviewed an MRI scan that was done at the [facility] showing moderately severe stenosis at L4-5 and a mild degree of scoliosis.  This is been treated conservatively, including with physical therapy, but she is seeing no improvement.  She has had a bilateral hip replacement.     She is here today to discuss surgery.  She says her 4 children and her grandchildren are all opposed to surgery, but she is suffering enough from the pain constantly the she's ready to consider it.    History  Past Medical History:   Diagnosis Date   • Bronchitis, acute    • Insomnia    • Lumbosacral radiculopathy    • Osteoarthritis    • Osteoporosis    • Sciatica    • Sinusitis, acute    • Spinal stenosis of lumbar region       Past Surgical History:   Procedure Laterality Date   • APPENDECTOMY     • HIP ARTHROPLASTY Left 07/01/2013   • HYSTEROSCOPY  01/01/1990   • TONSILLECTOMY     • TOTAL HIP ARTHROPLASTY Right 01/01/1997    family history includes Cancer in her father, mother, and sister.   Social History     Social History   • Marital status:      Spouse name: N/A   • Number of children: N/A   • Years of education: N/A     Social History Main Topics   • Smoking status: Never Smoker   • Smokeless tobacco: Not on file   • Alcohol use No   • Drug  use: No   • Sexual activity: Not on file     Other Topics Concern   • Not on file     Social History Narrative       Allergies:  Percocet [oxycodone-acetaminophen] and Neurontin [gabapentin]    Objective   Review of Systems  Constitutional: Negative for activity change, appetite change, chills, diaphoresis, fatigue, fever and unexpected weight change.   HENT: Negative for congestion, dental problem, drooling, ear discharge, ear pain, facial swelling, hearing loss, mouth sores, nosebleeds, postnasal drip, rhinorrhea, sinus pressure, sneezing, sore throat, tinnitus, trouble swallowing and voice change.    Eyes: Negative for photophobia, pain, discharge, redness, itching and visual disturbance.   Respiratory: Negative for apnea, cough, choking, chest tightness, shortness of breath, wheezing and stridor.    Cardiovascular: Negative for chest pain, palpitations and leg swelling.   Gastrointestinal: Negative for abdominal distention, abdominal pain, anal bleeding, blood in stool, constipation, diarrhea, nausea, rectal pain and vomiting.   Endocrine: Negative for cold intolerance, heat intolerance, polydipsia, polyphagia and polyuria.   Genitourinary: Negative for decreased urine volume, difficulty urinating, dysuria, enuresis, flank pain, frequency, genital sores, hematuria and urgency.   Musculoskeletal: Positive for arthralgias and back pain (Right leg pain). Negative for gait problem, joint swelling, myalgias, neck pain and neck stiffness.   Skin: Negative for color change, pallor, rash and wound.   Allergic/Immunologic: Negative for environmental allergies, food allergies and immunocompromised state.   Neurological: Negative for dizziness, tremors, seizures, syncope, facial asymmetry, speech difficulty, weakness, light-headedness, numbness and headaches.   Hematological: Negative for adenopathy. Does not bruise/bleed easily.   Psychiatric/Behavioral: Negative for agitation, behavioral problems, confusion, decreased  concentration, dysphoric mood, hallucinations, self-injury, sleep disturbance and suicidal ideas. The patient is not nervous/anxious and is not hyperactive.      Physical Exam:  NEUROLOGICAL EXAMINATION:    Straight leg raising is negative.  Dorsiflexion and plantar flexion are intact.  When she is walking her right leg tends to suddenly become weak and wobbly, but it is not a focal weakness.  There is no focal sensory loss.  Reflexes are difficult to find in either knee or ankle      Results Review:  MRI scan that was done at the [facility] showing moderately severe stenosis at L4-5 and a mild degree of scoliosis.    Assessment/Plan   Assessment       Lumbar stenosis L4-5 bilaterally, symptomatic on the right.           Plan       Recommend minimal access lumbar laminectomy L4-5.  We discussed the surgery, potential complications, and recovery.  She lives alone and does not have anybody with her to help her in her recovery.      Cassandra Lopez PA-C  On behalf of Cole Smith MD  11/13/17  3:28 PM       Electronically signed by Cassandra Lopez PA-C at 11/13/2017  3:30 PM      Cole Smith MD at 12/8/2017  6:34 AM          Pre-Op H&P (See Recent Office Note Attached)    Chief complaint: Low back pain into bilateral hips and legs    Review of Systems:  General ROS:  no fever, chills, rashes, No change since last office visit  Cardiovascular ROS: no chest pain or dyspnea on exertion  Respiratory ROS: no cough, shortness of breath, or wheezing    Immunization History:   Influenza:  Yes 2017  Pneumococcal:  Yes < 5 years  Tetanus:  unknown    Vital Signs:  Afebrile HR 59 O2 98% /68  Physical Exam:    CV:  S1S2 regular rate and rhythm, no murmur               Resp:  Clear to auscultation; respirations regular, even and unlabored    Results Review:    I reviewed the patient's new clinical results.    Cancer Staging (if applicable)  Cancer Patient: __ yes _x_no __unknown; If yes, clinical stage T:__  N:__M:__, stage group or __N/A    Isabel Byrd, APRN  12/8/2017   6:35 AM         Electronically signed by Cole Smith MD at 12/8/2017  6:36 AM    Source Note             HISTORY AND PHYSICAL for SURGERY    Referring Provider: Leigh Agustin MD    Chief complaint: Right hip and leg pain    Subjective .   History of present illness:    The patient is a 77-year-old woman with a right hip and leg pain syndrome that has progressed over the past 5 years.  It now has become nearly constant and bothers her even when she is reclining and resting almost as much as when she stands or walks.  I last saw her nearly 4 months ago and reviewed an MRI scan that was done at the [facility] showing moderately severe stenosis at L4-5 and a mild degree of scoliosis.  This is been treated conservatively, including with physical therapy, but she is seeing no improvement.  She has had a bilateral hip replacement.     She is here today to discuss surgery.  She says her 4 children and her grandchildren are all opposed to surgery, but she is suffering enough from the pain constantly the she's ready to consider it.    History  Past Medical History:   Diagnosis Date   • Bronchitis, acute    • Insomnia    • Lumbosacral radiculopathy    • Osteoarthritis    • Osteoporosis    • Sciatica    • Sinusitis, acute    • Spinal stenosis of lumbar region       Past Surgical History:   Procedure Laterality Date   • APPENDECTOMY     • HIP ARTHROPLASTY Left 07/01/2013   • HYSTEROSCOPY  01/01/1990   • TONSILLECTOMY     • TOTAL HIP ARTHROPLASTY Right 01/01/1997    family history includes Cancer in her father, mother, and sister.   Social History     Social History   • Marital status:      Spouse name: N/A   • Number of children: N/A   • Years of education: N/A     Social History Main Topics   • Smoking status: Never Smoker   • Smokeless tobacco: Not on file   • Alcohol use No   • Drug use: No   • Sexual activity: Not on file     Other Topics  Concern   • Not on file     Social History Narrative       Allergies:  Percocet [oxycodone-acetaminophen] and Neurontin [gabapentin]    Objective   Review of Systems  Constitutional: Negative for activity change, appetite change, chills, diaphoresis, fatigue, fever and unexpected weight change.   HENT: Negative for congestion, dental problem, drooling, ear discharge, ear pain, facial swelling, hearing loss, mouth sores, nosebleeds, postnasal drip, rhinorrhea, sinus pressure, sneezing, sore throat, tinnitus, trouble swallowing and voice change.    Eyes: Negative for photophobia, pain, discharge, redness, itching and visual disturbance.   Respiratory: Negative for apnea, cough, choking, chest tightness, shortness of breath, wheezing and stridor.    Cardiovascular: Negative for chest pain, palpitations and leg swelling.   Gastrointestinal: Negative for abdominal distention, abdominal pain, anal bleeding, blood in stool, constipation, diarrhea, nausea, rectal pain and vomiting.   Endocrine: Negative for cold intolerance, heat intolerance, polydipsia, polyphagia and polyuria.   Genitourinary: Negative for decreased urine volume, difficulty urinating, dysuria, enuresis, flank pain, frequency, genital sores, hematuria and urgency.   Musculoskeletal: Positive for arthralgias and back pain (Right leg pain). Negative for gait problem, joint swelling, myalgias, neck pain and neck stiffness.   Skin: Negative for color change, pallor, rash and wound.   Allergic/Immunologic: Negative for environmental allergies, food allergies and immunocompromised state.   Neurological: Negative for dizziness, tremors, seizures, syncope, facial asymmetry, speech difficulty, weakness, light-headedness, numbness and headaches.   Hematological: Negative for adenopathy. Does not bruise/bleed easily.   Psychiatric/Behavioral: Negative for agitation, behavioral problems, confusion, decreased concentration, dysphoric mood, hallucinations, self-injury,  "sleep disturbance and suicidal ideas. The patient is not nervous/anxious and is not hyperactive.      Physical Exam:  NEUROLOGICAL EXAMINATION:    Straight leg raising is negative.  Dorsiflexion and plantar flexion are intact.  When she is walking her right leg tends to suddenly become weak and wobbly, but it is not a focal weakness.  There is no focal sensory loss.  Reflexes are difficult to find in either knee or ankle      Results Review:  MRI scan that was done at the [facility] showing moderately severe stenosis at L4-5 and a mild degree of scoliosis.    Assessment/Plan   Assessment       Lumbar stenosis L4-5 bilaterally, symptomatic on the right.           Plan       Recommend minimal access lumbar laminectomy L4-5.  We discussed the surgery, potential complications, and recovery.  She lives alone and does not have anybody with her to help her in her recovery.      Cassandra Lopez PA-C  On behalf of Cole Smith MD  11/13/17  3:28 PM        Electronically signed by Cassandra Lopez PA-C at 11/13/2017  3:30 PM              Ramya Contreras #[id number] (CSN:[id number])  (77 y.o. F)  (Adm: 12/08/17)     [facility] 3G-S364-1           Attending Provider: Cole Smith MD  Comment              Last edited by  on  at        Allergies: Percocet [Oxycodone-acetaminophen], Neurontin [Gabapentin]    Isolation: (none)     Code Status: FULL      Ht: 165.1 cm (65\")     Wt: 59.3 kg (130 lb 11.7 oz)     Admission Wt: 59.3 kg (130 lb 11.7 oz)      Admission Cmt: None               Treatment Team        Provider Role Specialty From To       Cole Smith MD Attending Provider Neurosurgery 12/08/17 0606 --       Cole Smith MD Surgeon  Neurosurgery 11/13/17 1037 --       Cassandra Lopez PA-C Physician Assistant  Physician Assistant  12/08/17 0862 --       Laisha Curran Patient Care Technician  -- 12/09/17 0719 --       Leela Rinaldi, RN Care Coordinator  -- 12/09/17 0779 --       Raysa Monge RN " Registered Nurse  -- 12/09/17 0749 --           Orders to Be Acknowledged  Comment  Acknowledge All  Hide              Last edited by  on  at          New Orders Ordered at: Fri Dec 8, 2017 11:22 AM to be Acknowledged  Acknowledge Section       Start       Ordering Provider          12/08/17 1123  Inpatient Consult to [facility] Start: 12/08/17 1123, End: 12/08/17 1123, Once, Standing Count: 1 Occurrences, R   Provider: (Not yet assigned)    Cole Smith MD Acknowledge New                  New Orders Ordered at: Fri Dec 8, 2017  7:05 AM to be Acknowledged  Acknowledge Section       Start       Ordering Provider          12/08/17 0706  Inpatient Consult to [facility] Start: 12/08/17 0706, End: 12/08/17 0706, STAT, Standing Count: 1 Occurrences, STAT,  Discontinue Reason: Patient Transfer, Status: Canceled   Provider: (Not yet assigned)    Cole Smith MD Acknowledge New                  New Discontinued Orders to be Acknowledged  Acknowledge Section       Discontinued       Discontinuing Provider          12/08/17 0903  Inpatient Consult to [facility] Start: 12/08/17 0706, End: 12/08/17 0706, STAT, Standing Count: 1 Occurrences, STAT,  Discontinue Reason: Patient Transfer, Status: Canceled   Provider: (Not yet assigned)    Cole Smith MD Acknowledge Discontinue                    Length of Stay        Actual Admission Date          0 days 12/08/2017            Best[facility]ractice Advisories        Click to view active BestPractice Advisories         Treatment Team Sticky Notes  Comment              Last edited by  on  at             Sticky Notes to Physicians  Comment              Last edited by  on  at               Medical Problems  Comment              Last edited by  on  at          Hospital Problem List  Date Reviewed: 12/8/2017                Codes Priority Class Noted POA       Right lumbar radiculopathy ICD-10-CM: M54.16  ICD-9-CM: 724.4    11/13/2017 Unknown       Overview Signed 11/13/2017 10:37 AM by Cole Smith MD       Added automatically from request for surgery [id number]       Spinal stenosis of lumbar region with neurogenic claudication ICD-10-CM: M48.062  ICD-9-CM: 724.03   11/13/2017 Unknown       Overview Signed 11/13/2017 10:37 AM by Cole Smith MD       Added automatically from request for surgery [id number]       Herniation of lumbar intervertebral disc ICD-10-CM: M51.26  ICD-9-CM: 722.10   12/8/2017 Yes       Spinal stenosis, lumbar region, with neurogenic claudication ICD-10-CM: M48.062  ICD-9-CM: 724.03   12/8/2017 Yes                Non-Hospital Problem List  Date Reviewed: 12/8/2017                Codes Priority Class Noted       Lumbar stenosis ICD-10-CM: M48.061  ICD-9-CM: 724.02   7/6/2017       Overview Signed 7/6/2017 10:02 AM by Cole Smith MD       L4-5                Ancillary Consults    Expand  Hide           Start       Ordered       12/08/17 1123  Inpatient Consult to Case Management  Once    Complete Provider: (Not yet assigned)    12/08/17 1122           Vital Signs (last 2 days)        Date/Time    Temp    Pulse    Resp    BP    O2 Device    SpO2       12/09/17 0800  97.4 (36.3)  68  16  111/53  --  96       12/09/17 0438  98.2 (36.8)  81  16  111/55  --  94       12/08/17 2333  98 (36.7)  74  16  92/50  --  92       12/08/17 2000  --  --  --  --  room air  --       12/08/17 1926  98.2 (36.8)  80  16  104/59  --  93       12/08/17 1727  --  --  --  --  room air  --       12/08/17 1634  97.8 (36.6)  86  16  110/55  --  --       12/08/17 1557  --  --  --  --  room air  --       12/08/17 1510  98.5 (36.9)  85  16  114/64  room air  94       12/08/17 1410  --  --  --  --  room air  --       12/08/17 1202  --  --  --  --  room air  --       12/08/17 1024  97.6 (36.4)  70  16  135/63  room air  97       12/08/17 1018  --  --  --  --  room air  --       12/08/17 1000  97.8 (36.6)  75  16  129/74  --  99        12/08/17 0945  97.8 (36.6)  78  16  128/75  --  100       12/08/17 0930  97.5 (36.4)  75  16  130/64  --  100       12/08/17 0915  97.8 (36.6)  81  16  139/73  --  98       12/08/17 0910  98 (36.7)  82  16  135/71  --  100       12/08/17 0905  --  92  16  130/69  --  100       12/08/17 0853  98.1 (36.7)  94  16  128/72  nasal cannula  99       12/08/17 0702  97.7 (36.5)  61  18  134/68  room air  100                  Intake/Output Detail Report      Date 12/07/17 0701 - 12/08/17 0700 12/08/17 0701 - 12/09/17 0700 12/09/17 0701 - 12/10/17 0700   Shift 2277-9542 3900-0993 Total 0011-1279 6692-8653 Total 9496-2370 8712-3619 Total   I  N  T  A  K  E   P.O.    120  120         P.O.    120  120       I.V.    1100  1100         Volume (mL) (lactated ringers infusion)    1100  1100       Shift Total    1220  1220      O  U  T  P  U  T   Urine             Urine           Shift Total          NET    670 -950 -280               Patient Lines/Drains/Airways Status        Active Lines, Drains, and Airways        Peripheral IV Line - Single Lumen 12/08/17 0645 cephalic vein (lateral side of arm), left 18 gauge       Placement date:  12/08/17           Placement time:  0645  Days:  1         Assessments               12/09/17 0600    12/09/17 0400    12/09/17 0200                 Indication/Daily Review of Necessity  fluid thera-  py intermit-  tent   fluid thera-  py intermit-  tent   fluid thera-  py intermit-  tent          Site Preparation/Maintenance  dressing:  dry and int-  act   dressing:  dry and int-  act   dressing:  dry and int-  act          Securement  catheter  stabilizati-  on device,  secured with   catheter  stabilizati-  on device,  secured with   catheter  stabilizati-  on device,  secured with          Patency/Maintenance  flushed wit-  hout diffic-  ulty   flushed wit-  hout diffic-  ulty   flushed wit-  hout diffic-  ulty          Pump Type and Serial Number   infusion  pump   infusion  pump   infusion  pump          Phlebitis  0-->no symp-  toms   0-->no symp-  toms   0-->no symp-  toms          Infiltration  0-->no symp-  toms   0-->no symp-  toms   0-->no symp-  toms                                 Orders and Labs        Orders Overview Labs Since Admission         All Flowsheet Templates (all recorded)        Adult Patient Profile       Adult PCS Body System       Agents       Andres Score       All CPM LDAs       All CPM LDAs       Anesthesia Checklist       Assess       Billing Modifiers       CARE PLAN MINI-FLOWSHEET DATA       Checklist       Chronic Pain Assessment       CM Responsibilities       [place] Suicide Severity Rating Scale Past Month (C-SSRS Screen Version)       Critical Care Adult PCS Body System       Custom Formula Data       Daily care/ Safety       Data       Devices Testing Template       Document Patient Belongings       Encounter Vitals       ICU Vital Signs       Incision / Wounds       Intake/Output              Intra-op Care Plan       Lactation       Lines / Drains       Living Environment       narcotic waste       NPO Status       OR Readiness       Pain       Positioning       Pre-Op OR Checklist       Pressure Ulcer Screening       Profile       Readmission Risk Score       Respiratory       Screenings       Transfer of Care       Travel and Exposure Screening       Vaccination Screening       Vital Signs       Vitals Reassessment       Vitals, Intake and Output           ADT Events           Unit Room Bed Service Event       12/08/17 0606  ALONDRA OR ALONDRA OR NONE Surgery Admission       12/08/17 0637  ALONDRA OR ALONDRA OR NONE Surgery Patient Update       12/08/17 1013  ALONDRA OR ALONDRA OR NONE Surgery Transfer Out       12/08/17 1013  ALONDRA 3G S364 1 Surgery Transfer In           Appointments for Next 30 Days        None       Utilization Review           Reviewed on 12/8/2017 by Anh Trinidad, YRN        Review Entered Review  Status Criteria Set Name - Subset Criteria Status       12/8/2017 In Primary Outpatient        Details         12-8-17  Presented for Minimally Invasive Lumbar Laminectomy L4-5, Right.   OP

## 2017-12-09 NOTE — DISCHARGE PLACEMENT REQUEST
"Ramya Del Toro (77 y.o. Female)     Date of Birth Social Security Number Address Home Phone MRN    1940  [address] [address] [phone] [id number]    [facility] Mar[facility] Status          Unknown        Admission Date Admission Type Admitting Provider Attending Provider Department, Room/Bed    12/8/17 Elective Cole Smith MD Bean, James R, MD [facility] 3G, S364/1    Discharge Date Discharge Disposition Discharge Destination         Home or Self Care             Attending Provider: Cole Smith MD     Allergies:  Percocet [Oxycodone-acetaminophen], Neurontin [Gabapentin]    Isolation:  None   Infection:  None   Code Status:  FULL    Ht:  165.1 cm (65\")   Wt:  59.3 kg (130 lb 11.7 oz)    Admission Cmt:  None   Principal Problem:  None                Active Insurance as of 12/8/2017     Primary Coverage     Payor Plan Insurance Group Employer/Plan Group    MEDICARE MEDICARE A & B      Payor Plan Address Payor Plan Phone Number Effective From Effective To    [address] [phone] 7/1/2005     [place] [address]       Subscriber Name Subscriber Birth Date Member ID       RAMYA DEL TORO 1940 [id number]           Secondary Coverage     Payor Plan Insurance Group Employer/Plan Group     FOR LIFE  FOR LIFE MC SUPP      Payor Plan Address Payor Plan Phone Number Effective From Effective To    [address] [phone] 6/23/2017     [place] [address]       Subscriber Name Subscriber Birth Date Member ID       RAMYA DEL TORO 1940 [id number]                 Emergency Contacts      (Rel.) Home Phone Work Phone Mobile Phone    Lynn Del Toro (Daughter) [phone] -- --            Insurance Information                MEDICARE/MEDICARE A & B Phone: [phone]    Subscriber: Ramya Del Toro Subscriber#: [id number]    Group#:  Precert#:          FOR LIFE/ FOR LIFE MC SUPP Phone: [phone]    " Subscriber: Ramya Contreras Subscriber#: [id number]    Group#:  Precert#:              Discharge Summary      Cole Smith MD at 12/9/2017  7:44 AM          DISCHARGE SUMMARY    Date of Admission: 12/8/2017    Date of Discharge:  12/9/2017    Discharge Diagnosis: Lumbar stenosis L4-5    Procedures Performed:  Procedure(s):  MINIMALLY INVASIVE LUMBAR LAMINECTOMY L4-5    Referring Physician: Leigh Agustin M.D.    Presenting Problem/History of Present Illness  Right lumbar radiculopathy [M54.16]  Spinal stenosis of lumbar region with neurogenic claudication [M48.062]  Herniation of lumbar intervertebral disc [M51.26]  Spinal stenosis, lumbar region, with neurogenic claudication [M48.062]     Hospital Course  Patient is a 77 y.o. female who presented with a history of back pain radiating to the right hip and leg for 5 years and more recently to the left hip and leg.  MRI scan showed a moderately severe lumbar stenosis at L4-5 with minimal scoliosis.  Symptoms were not improved with physical therapy.    On the day of admission a minimal access lumbar laminectomy at L4-5 was performed.  There were no surgical complications.  The patient promptly resumed ambulation, oral intake, regular diet, and bladder function.  She was discharged home on the morning following surgery.  She lives alone, and home health was requested for wound checks.  Wound closure is subcuticular with Dermabond skin closure, such that she can shower.  Follow-up will be scheduled in 4 weeks.    Discharge Medications   Ramya Contreras   Home Medication Instructions ZUNILDA:[id number]    Printed on:12/09/17 0744   Medication Information                      aspirin 81 MG EC tablet  Take 81 mg by mouth Daily.             Garlic 10 MG capsule  Take 1 tablet/day by mouth Daily.             Glucosamine-Chondroitin (GLUCOSAMINE CHONDR COMPLEX PO)  Take 1 tablet/day by mouth Daily.             nitroglycerin (NITROSTAT) 0.4 MG SL tablet  Place 0.4 mg under  the tongue Every 5 (Five) Minutes As Needed for Chest Pain. Take no more than 3 doses in 15 minutes.             Omega-3 Fatty Acids (FISH OIL) 1000 MG capsule capsule  Take 1,000 mg by mouth Daily With Breakfast.             rosuvastatin (CRESTOR) 10 MG tablet  Take 10 mg by mouth Daily.             sertraline (ZOLOFT) 50 MG tablet  Take 50 mg by mouth Daily.             temazepam (RESTORIL) 30 MG capsule  Take 30 mg by mouth At Night As Needed for Sleep.             vitamin B-12 (CYANOCOBALAMIN) 100 MCG tablet  Take 50 mcg by mouth Daily.             Vitamin D, Cholecalciferol, (CHOLECALCIFEROL) 400 UNITS tablet  Take 400 Units by mouth Daily.                 Tish Brice MD  17  7:44 AM     Electronically signed by Tish Brice MD at 2017  7:47 AM          [address] [address]  Phone:  [phone]  Fax:   Date: Dec 9, 2017      Referral to Home Health     Patient:  Ramya Contreras MRN:  [id number]   [address] [address] :  1940  SSN:    Phone: [phone] Sex:  F      INSURANCE PAYOR PLAN GROUP # SUBSCRIBER ID   Primary:  Secondary: MEDICARE TRICARE FOR LIFE [id number]  [id number]   [id number]  [id number]      Referring Provider Information:  TISH BRICE Phone: [phone] Fax:       Referral Information:   # Visits:  1 Referral Type: Home Health [42]   Urgency:  Routine Referral Reason: Specialty Services Required   Start Date: Dec 9, 2017 End Date:  To be determined by Insurer   Diagnosis: Spinal stenosis, lumbar region, with neurogenic claudication (M48.062 [ICD-10-CM] 724.03 [ICD-9-CM])      Refer to Dept:   Refer to Provider:   Refer to Facility:       Face to Face Visit Date: 2017  Follow-up Provider for Plan of Care? I will be treating the patient on an ongoing basis.  Please send me the Plan of Care for signature.  Follow-up Provider: TISH BRICE [[id number]]  Reason/Clinical Findings: Lumbar  incision  Describe mobility limitations that make leaving home difficult: Postoperative lumbar pain  Nursing/Therapeutic Services Requested: Skilled Nursing  Skilled nursing orders: Wound care dressing/changes  Frequency: 1 Week 1     This document serves as a request of services and does not constitute Insurance authorization or approval of services.  To determine eligibility, please contact the members Insurance carrier to verify and review coverage.     If you have medical questions regarding this request for services. Please contact [address] at [phone] between the hours of 8:00am - 5:00pm (Mon-Fri).             Authorizing Provider:Cole Smith MD  Authorizing Provider's NPI: [id number]  Order Entered By: Cole Smith MD 12/9/2017  7:43 AM     Electronically signed by: Cole Smith MD 12/9/2017  7:43 AM

## 2017-12-18 ENCOUNTER — TELEPHONE (OUTPATIENT)
Dept: NEUROSURGERY | Facility: CLINIC | Age: 77
End: 2017-12-18

## 2017-12-18 NOTE — TELEPHONE ENCOUNTER
Patient should leave open to air.  Sounds like patient has an adhesive allergy.  Patient can get hydrocortisone cream over-the-counter.  For symptomatic relief.    If patient does not have anaphylactic reaction to Benadryl, They can probably can try topical ointment.

## 2017-12-18 NOTE — TELEPHONE ENCOUNTER
Home health nurse Lore called in to give more details on the patient's rash:     Said that it is mainly around incision, incision itself looks great, said there are 'raised red bumps' all around the incision.     She said that the patient took off 'the little sheet that goes over the dermabond incision on Friday' (even though the nurse told her not to) and soon after the rash started.     She did say that the patient is allergic to benadryl.

## 2017-12-18 NOTE — TELEPHONE ENCOUNTER
Provider:  Luis  Caller: Ramya Contreras  Time of call:   09:16 AM  Phone #:  [phone]  Surgery:  LAMI L4/5  Surgery Date:  12/08/17  Last visit:   11/02/17  Next visit: 01/04/18    Reason for call:       having a severe rash around incision area, wants to know if there's some kind of cream / medication she can take. Said that she is itching terribly

## 2018-01-04 ENCOUNTER — OFFICE VISIT (OUTPATIENT)
Dept: NEUROSURGERY | Facility: CLINIC | Age: 78
End: 2018-01-04

## 2018-01-04 VITALS
DIASTOLIC BLOOD PRESSURE: 84 MMHG | HEIGHT: 65 IN | BODY MASS INDEX: 21.33 KG/M2 | TEMPERATURE: 97 F | HEART RATE: 89 BPM | SYSTOLIC BLOOD PRESSURE: 122 MMHG | WEIGHT: 128 LBS

## 2018-01-04 DIAGNOSIS — M48.062 SPINAL STENOSIS OF LUMBAR REGION WITH NEUROGENIC CLAUDICATION: Primary | ICD-10-CM

## 2018-01-04 PROCEDURE — 99024 POSTOP FOLLOW-UP VISIT: CPT | Performed by: PHYSICIAN ASSISTANT

## 2018-01-04 NOTE — PROGRESS NOTES
Ramya Contreras is a 77 y.o. female who presents for a 4 week  follow up appointment status post lumbar laminectomy, L4-5, right, that took place on 12/08/2017.     HISTORY OF PRESENT ILLNESS and HOSPITAL COURSE:  The patient is a 77-year-old woman with a right hip and leg pain syndrome that has progressed over the past 5 years.  It had become nearly constant and bothered her even when she was reclining and resting almost as much as when she stood or walked.  Dr. Smith saw her in July 2017 and reviewed an MRI scan that was done at the [facility], which demonstrated moderately severe stenosis at L4-5 and a mild degree of scoliosis. She had been participating in conservative management, including physical therapy, but she was seeing no improvement.  Decompressive surgery was elected and took place under general anesthesia and was without surgical complication.      In the post-operative 4 weeks the patient has had excellent relief of the symptoms experienced prior to surgery.  The surgical incision site is clean, dry, healed.  She had an allergic reaction of itching and a rash diffusely across her back on POD #4, a reaction, she states, that she also experienced postoperatively from left knee surgery.  Likely this is a reaction to the chloraprep or ioban used during surgical preparation to sterile the operative site.  We will update her allergy list accordingly.  She was able to gain relief with application of triamcinolone 0.1% topical cream applied TID.      REVIEW OF SYSTEMS:  Review of Systems   Constitutional: Negative for chills, fatigue and fever.   Respiratory: Negative for shortness of breath.    Cardiovascular: Negative for chest pain and leg swelling.   Musculoskeletal: Positive for arthralgias and back pain. Negative for gait problem.   Neurological: Negative for weakness and numbness.   Psychiatric/Behavioral: Negative for sleep disturbance.       The following portions of the patient's history  were reviewed and updated as appropriate: allergies, current medications, past family history, past medical history, past social history, past surgical history and problem list.     PHYSICAL EXAM:  Physical Exam   Constitutional: She is oriented to person, place, and time. She appears well-developed and well-nourished.   Neck: Normal range of motion. Neck supple.   Pulmonary/Chest: Effort normal and breath sounds normal.   Musculoskeletal: Normal range of motion.   Neurological: She is alert and oriented to person, place, and time. She has normal strength. No cranial nerve deficit or sensory deficit.   Skin: Skin is warm, dry and intact. No rash noted. No erythema.   Psychiatric: She has a normal mood and affect. Her behavior is normal. Judgment and thought content normal.        ASSESSMENT AND PLAN:  Ms. Contreras is doing remarkably well in the postoperative period of time and the symptoms experienced prior to surgery have improved significantly.  She has had resolve of the skin reaction experienced postoperatively to the sterile preparation utilized and her incision site is dry, without erythema and healed very well.  She will return to yoga exercise, approved by Dr. Smith, in the forthcoming days.  This will serve as her final postoperative appointment, however,  I instructed the patient to contact the office if concerns or questions arise following today's appointment.  Additional instructions were given to the patient regarding physical activity and pertinent restrictions at this point in time, which were acknowledged with full understanding by the patient.  It has been a pleasure providing neurosurgical care to this patient and we are grateful for the opportunity.

## 2024-04-16 ENCOUNTER — OFFICE VISIT (OUTPATIENT)
Dept: NEUROLOGY | Facility: CLINIC | Age: 84
End: 2024-04-16
Payer: MEDICARE

## 2024-04-16 VITALS
SYSTOLIC BLOOD PRESSURE: 108 MMHG | OXYGEN SATURATION: 98 % | HEART RATE: 77 BPM | BODY MASS INDEX: 20.76 KG/M2 | WEIGHT: 121.6 LBS | HEIGHT: 64 IN | DIASTOLIC BLOOD PRESSURE: 78 MMHG

## 2024-04-16 DIAGNOSIS — G43.109 OCULAR MIGRAINE: Primary | ICD-10-CM

## 2024-04-16 DIAGNOSIS — R41.3 MEMORY LOSS: ICD-10-CM

## 2024-04-16 PROCEDURE — 99204 OFFICE O/P NEW MOD 45 MIN: CPT | Performed by: NURSE PRACTITIONER

## 2024-04-16 PROCEDURE — 1159F MED LIST DOCD IN RCRD: CPT | Performed by: NURSE PRACTITIONER

## 2024-04-16 PROCEDURE — 1160F RVW MEDS BY RX/DR IN RCRD: CPT | Performed by: NURSE PRACTITIONER

## 2024-04-16 RX ORDER — ONDANSETRON 4 MG/1
4 TABLET, ORALLY DISINTEGRATING ORAL EVERY 6 HOURS PRN
COMMUNITY

## 2024-04-16 RX ORDER — ROSUVASTATIN CALCIUM 40 MG/1
40 TABLET, COATED ORAL
COMMUNITY
Start: 2024-03-15

## 2024-04-16 RX ORDER — CETIRIZINE HYDROCHLORIDE 10 MG/1
10 TABLET ORAL
COMMUNITY
Start: 2023-08-02

## 2024-04-16 RX ORDER — RIMEGEPANT SULFATE 75 MG/75MG
75 TABLET, ORALLY DISINTEGRATING ORAL AS NEEDED
Qty: 4 TABLET | Refills: 0 | COMMUNITY
Start: 2024-04-16

## 2024-04-16 NOTE — PROGRESS NOTES
Neuro Office Visit      Encounter Date: 2024   Patient Name: Ramya Contreras  : 1940   MRN: [id number]   PCP:  Darlyn Grier MD     Chief Complaint:    Chief Complaint   Patient presents with    Visual aura       History of Present Illness: Ramya Contreras is a 83 y.o. female who is here today in Neurology for visual aura.    Migraine onset around .  She would develop a visual aura followed by severe migraine.    Unable to recall all of the medication she has been on for migraine but does recall Empirin and Darvon.  Usually takes oxycodone at the onset of the migraine.    Ocular migraine begins in both eyes and she is unable to give a description of the visual aura.    During one of the recent episode she was shopping when the visual changes started and she developed significant weakness and had to sit down.  Episode lasted 5 to 10 minutes and she remained weak for the rest of the day.    Ocular migraine occurs a couple of times a week.    Symptoms improve after she drinks water.    No headache following the visual disturbance.    She is not able to take triptans secondary to cardiovascular disease and myocardial infarction.    She has never tried Nurtec or Ubrelvy.    Memory loss:  Mild memory issues with forgetfulness.    Denies any difficulty with word finding.    No problem managing finances or medications.    No difficulty with cooking, leaving the stove or oven on.    MMSE 29/30    Subjective      Past Medical History:   Past Medical History:   Diagnosis Date    Anxiety     H/O hernia repair     Heart attack     2019    Insomnia     Lumbosacral radiculopathy     Macular degeneration     Osteoarthritis     Osteoporosis     Sciatica     Sinusitis, acute     Sleeplessness     Spinal stenosis of lumbar region     Wears glasses        Past Surgical History:   Past Surgical History:   Procedure Laterality Date    APPENDECTOMY      COCCYGECTOMY      fell and broke it off    COLONOSCOPY       EYE SURGERY      PATTI. CATARACTS    HIP ARTHROPLASTY Left 07/01/2013    HYSTERECTOMY      LAMINECTOMY AND MICRODISCECTOMY LUMBAR SPINE Right 12/08/2017    Procedure: MINIMALLY INVASIVE LUMBAR LAMINECTOMY L4-5 RIGHT;  Surgeon: Cole Smith MD;  Location: Counts include [facility] beds at the [facility];  Service:     REPLACEMENT TOTAL KNEE Right     TONSILLECTOMY      TOTAL HIP ARTHROPLASTY Right 01/01/1997       Family History:   Family History   Problem Relation Age of Onset    Cancer Mother     Cancer Father     Cancer Sister        Social History:   Social History     Socioeconomic History    Marital status:    Tobacco Use    Smoking status: Never    Smokeless tobacco: Never   Vaping Use    Vaping status: Never Used   Substance and Sexual Activity    Alcohol use: No    Drug use: No       Medications:     Current Outpatient Medications:     aspirin 81 MG EC tablet, Take 1 tablet by mouth Daily., Disp: , Rfl:     cetirizine (zyrTEC) 10 MG tablet, 1 tablet., Disp: , Rfl:     Garlic 10 MG capsule, Take 1 tablet/day by mouth Daily., Disp: , Rfl:     Glucosamine-Chondroitin (GLUCOSAMINE CHONDR COMPLEX PO), Take 1 tablet/day by mouth Daily., Disp: , Rfl:     nitroglycerin (NITROSTAT) 0.4 MG SL tablet, Place 1 tablet under the tongue Every 5 (Five) Minutes As Needed for Chest Pain. Take no more than 3 doses in 15 minutes., Disp: , Rfl:     rosuvastatin (CRESTOR) 40 MG tablet, Take 1 tablet by mouth every night at bedtime., Disp: , Rfl:     temazepam (RESTORIL) 30 MG capsule, Take 1 capsule by mouth At Night As Needed for Sleep., Disp: , Rfl:     triamcinolone (KENALOG) 0.1 % ointment, Apply  topically 3 (Three) Times a Day., Disp: 80 g, Rfl: 2    vitamin B-12 (CYANOCOBALAMIN) 100 MCG tablet, Take 0.5 tablets by mouth Daily., Disp: , Rfl:     Vitamin D, Cholecalciferol, (CHOLECALCIFEROL) 400 UNITS tablet, Take 1 tablet by mouth Daily., Disp: , Rfl:     ondansetron ODT (ZOFRAN-ODT) 4 MG disintegrating tablet, Take 1 tablet by mouth Every 6 (Six)  Hours As Needed. for nausea, Disp: , Rfl:     Rimegepant Sulfate (Nurtec) 75 MG tablet dispersible tablet, Take 1 tablet by mouth As Needed (Migraine) for up to 4 doses., Disp: 4 tablet, Rfl: 0    Allergies:   Allergies   Allergen Reactions    Benadryl [Diphenhydramine]     Chloraprep One Step [Chlorhexidine Gluconate] Itching    Percocet [Oxycodone-Acetaminophen] Itching    Neurontin [Gabapentin] Rash       PHQ-9 Total Score:     STEADI Fall Risk Assessment was completed, and patient is at LOW risk for falls.Assessment completed on:4/16/2024    Objective     Physical Exam:   Physical Exam  Eyes:      Extraocular Movements: EOM normal.      Pupils: Pupils are equal, round, and reactive to light.   Neurological:      Mental Status: She is oriented to person, place, and time.      Coordination: Finger-Nose-Finger Test, Heel to Shin Test and Romberg Test normal.      Gait: Gait is intact. Tandem walk normal.      Deep Tendon Reflexes:      Reflex Scores:       Tricep reflexes are 2+ on the right side and 2+ on the left side.       Bicep reflexes are 2+ on the right side and 2+ on the left side.       Brachioradialis reflexes are 2+ on the right side and 2+ on the left side.       Patellar reflexes are 2+ on the right side and 2+ on the left side.       Achilles reflexes are 2+ on the right side and 2+ on the left side.  Psychiatric:         Speech: Speech normal.         Neurologic Exam     Mental Status   Oriented to person, place, and time.   Registration: recalls 3 of 3 objects. Recall at 5 minutes: recalls 3 of 3 objects. Follows 3 step commands.   Attention: normal. Concentration: normal.   Speech: speech is normal   Level of consciousness: alert  Knowledge: good. Able to perform simple calculations.   Able to name object. Able to read. Able to repeat. Able to write. Normal comprehension.     Cranial Nerves     CN II   Visual fields full to confrontation.     CN III, IV, VI   Pupils are equal, round, and reactive  "to light.  Extraocular motions are normal.   CN III: no CN III palsy  CN VI: no CN VI palsy    CN V   Facial sensation intact.     CN VII   Facial expression full, symmetric.     CN VIII   CN VIII normal.     CN IX, X   CN IX normal.   CN X normal.     CN XI   CN XI normal.     CN XII   CN XII normal.     Motor Exam     Strength   Right neck flexion: 5/5  Left neck flexion: 5/5  Right neck extension: 5/5  Left neck extension: 5/5  Right deltoid: 5/5  Left deltoid: 5/5  Right biceps: 5/5  Left biceps: 5/5  Right triceps: 5/5  Left triceps: 5/5  Right wrist flexion: 5/5  Left wrist flexion: 5/5  Right wrist extension: 5/5  Left wrist extension: 5/5  Right interossei: 5/5  Left interossei: 5/5  Right abdominals: 5/5  Left abdominals: 5/5  Right iliopsoas: 5/5  Left iliopsoas: 5/5  Right quadriceps: 5/5  Left quadriceps: 5/5  Right hamstrin/5  Left hamstrin/5  Right glutei: 5/5  Left glutei: 5/5  Right anterior tibial: 5/5  Left anterior tibial: 5/5  Right posterior tibial: 5/5  Left posterior tibial: 5/5  Right peroneal: 5/5  Left peroneal: 5/5  Right gastroc: 5/5  Left gastroc: 5/5    Sensory Exam   Light touch normal.     Gait, Coordination, and Reflexes     Gait  Gait: normal    Coordination   Romberg: negative  Finger to nose coordination: normal  Heel to shin coordination: normal  Tandem walking coordination: normal    Tremor   Resting tremor: present  Intention tremor: absent  Action tremor: absent    Reflexes   Right brachioradialis: 2+  Left brachioradialis: 2+  Right biceps: 2+  Left biceps: 2+  Right triceps: 2+  Left triceps: 2+  Right patellar: 2+  Left patellar: 2+  Right achilles: 2+  Left achilles: 2+  Right : 2+  Left : 2+       Vital Signs:   Vitals:    24 0921   BP: 108/78   Pulse: 77   SpO2: 98%   Weight: 55.2 kg (121 lb 9.6 oz)   Height: 162.6 cm (64\")     Body mass index is 20.87 kg/m².     Results:   Imaging:   No Images in the past 120 days found..     Labs:    No results " "found for: \"CMP\", \"PROTEIN\", \"ANTIMOGAB\", \"[id number]\", \"JCVRESULT\", \"QUANTTBGOLD\", \"CBCDIF\", \"IGGALBSER\"     Assessment / Plan      Assessment/Plan:   Diagnoses and all orders for this visit:    1. Ocular migraine (Primary)  Comments:  Nurtec samples given    2. Memory loss    Other orders  -     Rimegepant Sulfate (Nurtec) 75 MG tablet dispersible tablet; Take 1 tablet by mouth As Needed (Migraine) for up to 4 doses.  Dispense: 4 tablet; Refill: 0           Patient Education:     Reviewed medications, potential side effects and signs and symptoms to report. Discussed risk versus benefits of treatment plan with patient and/or family-including medications, labs and radiology that may be ordered. Addressed questions and concerns during visit. Patient and/or family verbalized understanding and agree with plan. Instructed to call the office with any questions and report to ER with any life-threatening symptoms.     Follow Up:   Return in about 3 months (around 7/16/2024).    I spent 30  minutes face to face with the patient. I personally spent 50 percent of this time counseling and discussing diagnosis, diagnostic testing, driving, treatment options, and management .       During this visit the following were done:  Labs Reviewed [x]    Labs Ordered []    Radiology Reports Reviewed []    Radiology Ordered []    PCP Records Reviewed []    Referring Provider Records Reviewed [x]    ER Records Reviewed []    Hospital Records Reviewed []    History Obtained From Family []    Radiology Images Reviewed []    Other Reviewed []    Records Requested []      NADIRA Gao  [facility]  [address]  [facility] [address] [phone]  "

## 2024-08-01 NOTE — OP NOTE
OPERATIVE REPORT    DATE OF OPERATION: 12/8/17    PREOPERATIVE DIAGNOSIS: Lumbar stenosis L4-5    POSTOPERATIVE DIAGNOSIS: Same    PROCEDURE PERFORMED: Lumbar laminectomy L4-5    SURGEON: Cole Smith MD    ASSISTANT: Cassandra Lopez PA-C    INDICATIONS: Patient had back and bilateral leg pain, right more so than left, with MR scan evidence of significant stenosis at L4-5 area decompression was elected.    DESCRIPTON OF PROCEDURE: Surgery was performed under general anesthesia in the prone position on the laminectomy frame.  The back was prepared sterilely and draped.  The L4 lamina was identified with a spinal needle and an AP fluoroscopic image.  A short 18 mm skin incision was made on the left of the midline and a guidewire and dilators were used to place a 4 cm length x 18 mm width tubular retractor.  AP fluoroscopy confirmed this to be the L4-5 level on the left.    A high-speed drill was used to perform the laminectomy, beginning on the ipsilateral side, and removing the inferior lamina of L4]. Drilling was extended to the floor lateral recess on the ipsilateral side, thinning the medial margin of the facet, then the tube was angled across the midline to the contralateral side and drilling continued, removing the lamina until the ligamentum flavum was exposed and the lateral recess on the far side reached. The ligamentum flavum was exposured bilaterally. The laminectomy was extended cranially to the point where the ligamentum flavum thinned away and epidural fat was visible. The exposure was extended caudally to the level of the superior lamina of L5.  A 3 mm punch was used to remove the ligamentum flavum, beginning at the midline and partially removing the ligamentum on the ipsilateral side to expose the dura, then removing the ligamentum extensively into the lateral recess on the contralateral side until the floor of the lateral recess and the epidural veins were seen.  Gelfoam was placed in the far lateral  recess for hemostasis.  The tube was angled back to the ipsilateral side and the ligamentum flavum excision completed with the Kerrison punch until the floor the lateral recess and the epidural veins were seen and all dural compression was confirmed visually to be alleviated.  Removal of the ligament was extended caudally to the superior lamina of L5  The bone was waxed as necessary for hemostasis.  The site was irrigated and Gelfoam used as necessary to complete hemostasis.  Gelfoam was placed over the laminectomy site.  The tubular retractor was removed.  Marcaine was injected in the paraspinous muscle.  Subcuticular Vicryl closure was performed and Dermabond Prineo was applied to the skin.  Blood loss was 45 milliliters.    Cole Smith M.D.   No Render In Strict Bullet Format?: No Initiate Treatment: tretinoin 0.025 % topical cream apply a pea size amount to entire face at bedtime every other night, may increase to every night as tolerated Detail Level: Simple

## 2024-08-22 ENCOUNTER — TELEPHONE (OUTPATIENT)
Dept: NEUROLOGY | Facility: CLINIC | Age: 84
End: 2024-08-22

## 2024-09-23 ENCOUNTER — OFFICE VISIT (OUTPATIENT)
Dept: NEUROLOGY | Facility: CLINIC | Age: 84
End: 2024-09-23
Payer: MEDICARE

## 2024-09-23 VITALS
DIASTOLIC BLOOD PRESSURE: 78 MMHG | HEART RATE: 81 BPM | BODY MASS INDEX: 20.83 KG/M2 | WEIGHT: 122 LBS | OXYGEN SATURATION: 99 % | HEIGHT: 64 IN | SYSTOLIC BLOOD PRESSURE: 116 MMHG

## 2024-09-23 DIAGNOSIS — G43.C0 PERIODIC HEADACHE SYNDROME, NOT INTRACTABLE: Primary | ICD-10-CM

## 2024-09-23 PROCEDURE — 1160F RVW MEDS BY RX/DR IN RCRD: CPT | Performed by: PSYCHIATRY & NEUROLOGY

## 2024-09-23 PROCEDURE — 99214 OFFICE O/P EST MOD 30 MIN: CPT | Performed by: PSYCHIATRY & NEUROLOGY

## 2024-09-23 PROCEDURE — 1159F MED LIST DOCD IN RCRD: CPT | Performed by: PSYCHIATRY & NEUROLOGY

## 2024-09-23 RX ORDER — HYDROXYZINE HYDROCHLORIDE 10 MG/1
TABLET, FILM COATED ORAL
COMMUNITY
Start: 2024-09-11

## 2024-09-23 RX ORDER — CLOPIDOGREL BISULFATE 75 MG/1
1 TABLET ORAL DAILY
COMMUNITY
Start: 2024-08-21

## 2024-09-24 ENCOUNTER — OFFICE VISIT (OUTPATIENT)
Dept: CARDIOLOGY | Facility: CLINIC | Age: 84
End: 2024-09-24
Payer: MEDICARE

## 2024-09-24 ENCOUNTER — HOSPITAL ENCOUNTER (OUTPATIENT)
Dept: CARDIOLOGY | Facility: HOSPITAL | Age: 84
Discharge: HOME | End: 2024-09-24

## 2024-09-24 VITALS
OXYGEN SATURATION: 97 % | HEIGHT: 63 IN | SYSTOLIC BLOOD PRESSURE: 122 MMHG | BODY MASS INDEX: 21.44 KG/M2 | WEIGHT: 121 LBS | DIASTOLIC BLOOD PRESSURE: 70 MMHG | HEART RATE: 70 BPM

## 2024-09-24 DIAGNOSIS — Q21.10 ASD (ATRIAL SEPTAL DEFECT): Primary | ICD-10-CM

## 2024-09-24 DIAGNOSIS — I95.1 ORTHOSTASIS: ICD-10-CM

## 2024-09-24 DIAGNOSIS — G45.9 TIA (TRANSIENT ISCHEMIC ATTACK): ICD-10-CM

## 2024-09-24 DIAGNOSIS — Z00.6 ENCOUNTER FOR EXAMINATION FOR NORMAL COMPARISON AND CONTROL IN CLINICAL RESEARCH PROGRAM: ICD-10-CM

## 2024-09-24 PROCEDURE — 93000 ELECTROCARDIOGRAM COMPLETE: CPT | Performed by: INTERNAL MEDICINE

## 2024-09-24 PROCEDURE — 1160F RVW MEDS BY RX/DR IN RCRD: CPT | Performed by: INTERNAL MEDICINE

## 2024-09-24 PROCEDURE — 99204 OFFICE O/P NEW MOD 45 MIN: CPT | Performed by: INTERNAL MEDICINE

## 2024-09-24 PROCEDURE — 1159F MED LIST DOCD IN RCRD: CPT | Performed by: INTERNAL MEDICINE

## 2025-03-26 PROBLEM — Z86.73 HISTORY OF TIA (TRANSIENT ISCHEMIC ATTACK): Status: ACTIVE | Noted: 2024-09-24

## (undated) DEVICE — SOL LR 1000ML

## (undated) DEVICE — LARYNG GLIDESCOPE COBALT/RANGER GVL3ST

## (undated) DEVICE — AIRWY 90MM NO9

## (undated) DEVICE — MEDI-VAC NON-CONDUCTIVE SUCTION TUBING: Brand: CARDINAL HEALTH

## (undated) DEVICE — ENCORE® LATEX MICRO SIZE 7, STERILE LATEX POWDER-FREE SURGICAL GLOVE: Brand: ENCORE

## (undated) DEVICE — OIL CARTRIDGE: Brand: CORE, MAESTRO

## (undated) DEVICE — CANNULA,OXY,ADULT,SUPERSOFT,W/7'TUB,UC: Brand: MEDLINE

## (undated) DEVICE — SYS SKIN CLS DERMABOND PRINEO W/22CM MESH TP

## (undated) DEVICE — MEDI-VAC YANKAUER SUCTION HANDLE W/BULBOUS TIP: Brand: CARDINAL HEALTH

## (undated) DEVICE — DRSNG SURESITE123 4X4.8IN

## (undated) DEVICE — ENCORE® LATEX MICRO SIZE 8, STERILE LATEX POWDER-FREE SURGICAL GLOVE: Brand: ENCORE

## (undated) DEVICE — ANTIBACTERIAL UNDYED BRAIDED (POLYGLACTIN 910), SYNTHETIC ABSORBABLE SUTURE: Brand: COATED VICRYL

## (undated) DEVICE — 3.0MM PRECISION NEURO (MATCH HEAD)

## (undated) DEVICE — ENCORE® LATEX MICRO SIZE 6.5, STERILE LATEX POWDER-FREE SURGICAL GLOVE: Brand: ENCORE

## (undated) DEVICE — SUT VIC 0 UR5 27IN VCP376H

## (undated) DEVICE — DIFFUSER: Brand: CORE, MAESTRO

## (undated) DEVICE — PK MED 10